# Patient Record
Sex: MALE | Race: BLACK OR AFRICAN AMERICAN | NOT HISPANIC OR LATINO | Employment: OTHER | URBAN - METROPOLITAN AREA
[De-identification: names, ages, dates, MRNs, and addresses within clinical notes are randomized per-mention and may not be internally consistent; named-entity substitution may affect disease eponyms.]

---

## 2017-12-08 ENCOUNTER — TRANSCRIBE ORDERS (OUTPATIENT)
Dept: ADMINISTRATIVE | Facility: HOSPITAL | Age: 62
End: 2017-12-08

## 2017-12-08 DIAGNOSIS — R59.1 LYMPHADENOPATHY: Primary | ICD-10-CM

## 2017-12-08 DIAGNOSIS — R93.89 ABNORMAL CHEST X-RAY: ICD-10-CM

## 2017-12-15 ENCOUNTER — HOSPITAL ENCOUNTER (OUTPATIENT)
Dept: CT IMAGING | Facility: HOSPITAL | Age: 62
Discharge: HOME | End: 2017-12-15
Attending: INTERNAL MEDICINE | Admitting: INTERNAL MEDICINE

## 2017-12-15 DIAGNOSIS — R93.89 ABNORMAL CHEST X-RAY: ICD-10-CM

## 2017-12-15 DIAGNOSIS — R59.1 LYMPHADENOPATHY: ICD-10-CM

## 2017-12-15 LAB — CREAT BLDA-MCNC: 1.3 MG/DL (ref 0.6–1.3)

## 2017-12-15 PROCEDURE — 71260 CT THORAX DX C+: CPT

## 2017-12-15 PROCEDURE — 82565 ASSAY OF CREATININE: CPT

## 2017-12-15 PROCEDURE — 0 IOPAMIDOL 61 % SOLUTION: Performed by: INTERNAL MEDICINE

## 2017-12-15 RX ADMIN — IOPAMIDOL 75 ML: 612 INJECTION, SOLUTION INTRAVENOUS at 09:30

## 2018-01-11 ENCOUNTER — ANESTHESIA (OUTPATIENT)
Dept: GASTROENTEROLOGY | Facility: HOSPITAL | Age: 63
End: 2018-01-11

## 2018-01-11 ENCOUNTER — ANESTHESIA EVENT (OUTPATIENT)
Dept: GASTROENTEROLOGY | Facility: HOSPITAL | Age: 63
End: 2018-01-11

## 2018-01-11 ENCOUNTER — APPOINTMENT (OUTPATIENT)
Dept: GENERAL RADIOLOGY | Facility: HOSPITAL | Age: 63
End: 2018-01-11

## 2018-01-11 ENCOUNTER — HOSPITAL ENCOUNTER (OUTPATIENT)
Facility: HOSPITAL | Age: 63
Setting detail: HOSPITAL OUTPATIENT SURGERY
Discharge: HOME | End: 2018-01-11
Attending: INTERNAL MEDICINE | Admitting: INTERNAL MEDICINE

## 2018-01-11 VITALS
OXYGEN SATURATION: 98 % | BODY MASS INDEX: 22.22 KG/M2 | HEIGHT: 69 IN | TEMPERATURE: 98.2 F | HEART RATE: 78 BPM | WEIGHT: 150 LBS | DIASTOLIC BLOOD PRESSURE: 70 MMHG | RESPIRATION RATE: 18 BRPM | SYSTOLIC BLOOD PRESSURE: 131 MMHG

## 2018-01-11 DIAGNOSIS — J84.9 INTERSTITIAL LUNG DISEASE (HCC): ICD-10-CM

## 2018-01-11 LAB
APPEARANCE FLD: ABNORMAL
COLOR FLD: COLORLESS
EOSINOPHIL NFR FLD MANUAL: 2 %
GIE STN SPEC: NORMAL
LYMPHOCYTES NFR FLD MANUAL: 31 %
MONOCYTES NFR FLD: 3 %
MONOS+MACROS NFR FLD: 54 %
NEUTROPHILS NFR FLD MANUAL: 10 %
OTHER CELLS FLUID PER 100/WBCS: 50 /100 WBCS
RBC # FLD AUTO: 17 /MM3
WBC # FLD: 60 /MM3

## 2018-01-11 PROCEDURE — 71045 X-RAY EXAM CHEST 1 VIEW: CPT

## 2018-01-11 PROCEDURE — 88112 CYTOPATH CELL ENHANCE TECH: CPT | Performed by: INTERNAL MEDICINE

## 2018-01-11 PROCEDURE — 87071 CULTURE AEROBIC QUANT OTHER: CPT | Performed by: INTERNAL MEDICINE

## 2018-01-11 PROCEDURE — 87206 SMEAR FLUORESCENT/ACID STAI: CPT | Performed by: INTERNAL MEDICINE

## 2018-01-11 PROCEDURE — 25010000002 PROPOFOL 10 MG/ML EMULSION: Performed by: ANESTHESIOLOGY

## 2018-01-11 PROCEDURE — 88305 TISSUE EXAM BY PATHOLOGIST: CPT | Performed by: INTERNAL MEDICINE

## 2018-01-11 PROCEDURE — 76000 FLUOROSCOPY <1 HR PHYS/QHP: CPT

## 2018-01-11 PROCEDURE — 25010000002 PROPOFOL 1000 MG/ML EMULSION: Performed by: ANESTHESIOLOGY

## 2018-01-11 PROCEDURE — 87116 MYCOBACTERIA CULTURE: CPT | Performed by: INTERNAL MEDICINE

## 2018-01-11 PROCEDURE — 87205 SMEAR GRAM STAIN: CPT | Performed by: INTERNAL MEDICINE

## 2018-01-11 PROCEDURE — 88312 SPECIAL STAINS GROUP 1: CPT | Performed by: INTERNAL MEDICINE

## 2018-01-11 PROCEDURE — 87102 FUNGUS ISOLATION CULTURE: CPT | Performed by: INTERNAL MEDICINE

## 2018-01-11 PROCEDURE — 89051 BODY FLUID CELL COUNT: CPT | Performed by: INTERNAL MEDICINE

## 2018-01-11 RX ORDER — SODIUM CHLORIDE, SODIUM LACTATE, POTASSIUM CHLORIDE, CALCIUM CHLORIDE 600; 310; 30; 20 MG/100ML; MG/100ML; MG/100ML; MG/100ML
30 INJECTION, SOLUTION INTRAVENOUS CONTINUOUS PRN
Status: DISCONTINUED | OUTPATIENT
Start: 2018-01-11 | End: 2018-01-11 | Stop reason: HOSPADM

## 2018-01-11 RX ORDER — SODIUM CHLORIDE, SODIUM LACTATE, POTASSIUM CHLORIDE, CALCIUM CHLORIDE 600; 310; 30; 20 MG/100ML; MG/100ML; MG/100ML; MG/100ML
INJECTION, SOLUTION INTRAVENOUS CONTINUOUS PRN
Status: DISCONTINUED | OUTPATIENT
Start: 2018-01-11 | End: 2018-01-11 | Stop reason: SURG

## 2018-01-11 RX ORDER — NISOLDIPINE 8.5 MG/1
8.5 TABLET, FILM COATED, EXTENDED RELEASE ORAL DAILY
COMMUNITY
End: 2021-08-29

## 2018-01-11 RX ORDER — PROPOFOL 10 MG/ML
VIAL (ML) INTRAVENOUS AS NEEDED
Status: DISCONTINUED | OUTPATIENT
Start: 2018-01-11 | End: 2018-01-11 | Stop reason: SURG

## 2018-01-11 RX ORDER — DIFLUPREDNATE OPHTHALMIC 0.5 MG/ML
1 EMULSION OPHTHALMIC 4 TIMES DAILY
Status: ON HOLD | COMMUNITY
End: 2018-01-11

## 2018-01-11 RX ORDER — LIDOCAINE HYDROCHLORIDE 10 MG/ML
INJECTION, SOLUTION EPIDURAL; INFILTRATION; INTRACAUDAL; PERINEURAL AS NEEDED
Status: DISCONTINUED | OUTPATIENT
Start: 2018-01-11 | End: 2018-01-11 | Stop reason: HOSPADM

## 2018-01-11 RX ORDER — LIDOCAINE HYDROCHLORIDE 20 MG/ML
INJECTION, SOLUTION INFILTRATION; PERINEURAL AS NEEDED
Status: DISCONTINUED | OUTPATIENT
Start: 2018-01-11 | End: 2018-01-11 | Stop reason: SURG

## 2018-01-11 RX ORDER — PREDNISOLONE ACETATE 10 MG/ML
1 SUSPENSION/ DROPS OPHTHALMIC 4 TIMES DAILY
COMMUNITY
End: 2021-08-29

## 2018-01-11 RX ORDER — EPHEDRINE SULFATE 50 MG/ML
INJECTION, SOLUTION INTRAVENOUS AS NEEDED
Status: DISCONTINUED | OUTPATIENT
Start: 2018-01-11 | End: 2018-01-11 | Stop reason: SURG

## 2018-01-11 RX ADMIN — PROPOFOL 150 MG: 10 INJECTION, EMULSION INTRAVENOUS at 09:15

## 2018-01-11 RX ADMIN — PROPOFOL 200 MG: 10 INJECTION, EMULSION INTRAVENOUS at 09:14

## 2018-01-11 RX ADMIN — EPHEDRINE SULFATE 10 MG: 50 INJECTION INTRAMUSCULAR; INTRAVENOUS; SUBCUTANEOUS at 09:33

## 2018-01-11 RX ADMIN — EPHEDRINE SULFATE 10 MG: 50 INJECTION INTRAMUSCULAR; INTRAVENOUS; SUBCUTANEOUS at 09:22

## 2018-01-11 RX ADMIN — SODIUM CHLORIDE, POTASSIUM CHLORIDE, SODIUM LACTATE AND CALCIUM CHLORIDE: 600; 310; 30; 20 INJECTION, SOLUTION INTRAVENOUS at 09:08

## 2018-01-11 RX ADMIN — PROPOFOL 200 MCG/KG/MIN: 10 INJECTION, EMULSION INTRAVENOUS at 09:18

## 2018-01-11 RX ADMIN — LIDOCAINE HYDROCHLORIDE 50 MG: 20 INJECTION, SOLUTION INFILTRATION; PERINEURAL at 09:14

## 2018-01-11 RX ADMIN — SODIUM CHLORIDE, POTASSIUM CHLORIDE, SODIUM LACTATE AND CALCIUM CHLORIDE 30 ML/HR: 600; 310; 30; 20 INJECTION, SOLUTION INTRAVENOUS at 09:01

## 2018-01-11 NOTE — PLAN OF CARE
Problem: Patient Care Overview (Adult)  Goal: Plan of Care Review  Outcome: Ongoing (interventions implemented as appropriate)   01/11/18 0847   Coping/Psychosocial Response Interventions   Plan Of Care Reviewed With patient   Patient Care Overview   Progress no change     Goal: Adult Individualization and Mutuality  Outcome: Ongoing (interventions implemented as appropriate)    Goal: Discharge Needs Assessment  Outcome: Ongoing (interventions implemented as appropriate)   01/11/18 0847   Discharge Needs Assessment   Concerns To Be Addressed basic needs concerns   Discharge Disposition home or self-care   Living Environment   Transportation Available car       Problem: GI Endoscopy (Adult)  Goal: Signs and Symptoms of Listed Potential Problems Will be Absent or Manageable (GI Endoscopy)  Outcome: Ongoing (interventions implemented as appropriate)   01/11/18 0847   GI Endoscopy   Problems Assessed (GI Endoscopy) pain   Problems Present (GI Endoscopy) none

## 2018-01-11 NOTE — ANESTHESIA PREPROCEDURE EVALUATION
Anesthesia Evaluation     Patient summary reviewed and Nursing notes reviewed   NPO Solid Status: > 8 hours  NPO Liquid Status: > 8 hours     Airway   Mallampati: II  Dental      Pulmonary - normal exam    breath sounds clear to auscultation  (+) shortness of breath,   Cardiovascular - normal exam    (+) hypertension,       Neuro/Psych- negative ROS  GI/Hepatic/Renal/Endo - negative ROS     Musculoskeletal (-) negative ROS    Abdominal    Substance History - negative use     OB/GYN          Other - negative ROS                                               Anesthesia Plan    ASA 2     general   total IV anesthesia  intravenous induction   Anesthetic plan and risks discussed with patient.

## 2018-01-11 NOTE — ANESTHESIA POSTPROCEDURE EVALUATION
"Patient: Jaket A Navdeep    Procedure Summary     Date Anesthesia Start Anesthesia Stop Room / Location    01/11/18 0908 0951  [facility]       Procedure Diagnosis Surgeon Provider    BRONCHOSCOPY WITH FLUORO AND CRYO LUNG BIOPSY (N/A Bronchus) No diagnosis on file. MD Crow Brewer MD          Anesthesia Type: general  Last vitals  BP   97/56 (01/11/18 0949)   Temp   36.8 °C (98.2 °F) (01/11/18 0949)   Pulse   76 (01/11/18 0949)   Resp   16 (01/11/18 0949)     SpO2   97 % (01/11/18 0949)     Post Anesthesia Care and Evaluation    Patient location during evaluation: PACU  Patient participation: complete - patient participated  Level of consciousness: awake  Pain score: 0  Pain management: adequate  Airway patency: patent  Anesthetic complications: No anesthetic complications  PONV Status: none  Cardiovascular status: acceptable  Respiratory status: acceptable  Hydration status: acceptable    Comments: BP 97/56  Pulse 76  Temp 36.8 °C (98.2 °F)  Resp 16  Ht 175.3 cm (69\")  Wt 68 kg (150 lb)  SpO2 97%  BMI 22.15 kg/m2      "

## 2018-01-11 NOTE — ANESTHESIA PROCEDURE NOTES
Airway  Urgency: elective    Difficult airway    General Information and Staff    Patient location during procedure: OR  Anesthesiologist: EDVIN WELSH    Indications and Patient Condition  Indications for airway management: airway protection    Preoxygenated: yes  Mask difficulty assessment: 1 - vent by mask    Final Airway Details  Final airway type: supraglottic airway      Successful airway: classic  Size 5    Number of attempts at approach: 1    Additional Comments  LMA inserted with ease

## 2018-01-11 NOTE — NURSING NOTE
Pt's portable chest xray post procedure stated that there is no pneumothorax. Informed Dr. Baum. Pt ok to d.c per Dr. Baum. Pt is in no respiratory distress at this time.

## 2018-01-11 NOTE — OP NOTE
Bronchoscopy Procedure Note    Procedure:  1. Bronchoscopy, Diagnostic    Pre-Operative Diagnosis:  Interstitial lung disease    Post-Operative Diagnosis: Same    Indication:   Interstitial lung disease    Anesthesia: Monitored Anesthesia Care (MAC)    Procedure Details: Patient was consented for the procedure with all risk and benefit of the procedure explained in detail.  Patient was given the opportunity to ask questions and all concerns were answered.  The bronchocope was inserted into the main airway via the LMA. An anatomical survey was done of the main airways and the subsegmental bronchus to at least the first subsegmental level of all five lobes of both lungs.  The findings are reported below.  A bronchialalveolar lavage was performed using aliquots of normal saline instilled into the airways then aspirated back.    Findings:  Bronchoscope passed through LMA to the level of the vocal cords.  Lidocaine used for local anesthetic over vocal cords.  Bronchoscope was passed between the vocal cords into the trachea.  All airways were visualized to at least the first subsegment level of all 5 lobes of both lungs.  Airways were of normal size and caliber.  No endobronchial lesions seen.  Bronchial alveolar lavage performed in left lower lobe with 120cc saline instilled and 60cc cellular return.  Fluoroscopically guided transbronchial biopsies performed in the left lower lobe x 3 and then fluoroscopically guided transbronchial biopsies performed with cryoprobe x 3 in left lower lobe.  Cryo biopsy tissue samples quite large and will send for lung pathologist outside of [facility] to review.    Estimated Blood Loss:  Minimal           Specimens:  As above                Complications:  None; patient tolerated the procedure well.           Disposition: PACU - hemodynamically stable.      Patient tolerated the procedure well.    Den Baum MD  1/11/2018  9:38 AM

## 2018-01-11 NOTE — ANESTHESIA POSTPROCEDURE EVALUATION
"Patient: Nahumnest A Navdeep    Procedure Summary     Date Anesthesia Start Anesthesia Stop Room / Location    01/11/18 0908 0951  [facility]       Procedure Diagnosis Surgeon Provider    BRONCHOSCOPY WITH FLUORO AND CRYO LUNG BIOPSY (N/A Bronchus) No diagnosis on file. MD Crow Brewer MD          Anesthesia Type: general  Last vitals  BP   131/70 (01/11/18 1011)   Temp   36.8 °C (98.2 °F) (01/11/18 0949)   Pulse   78 (01/11/18 1011)   Resp   18 (01/11/18 1011)     SpO2   98 % (01/11/18 1011)     Post Anesthesia Care and Evaluation    Patient location during evaluation: PACU  Patient participation: complete - patient participated  Level of consciousness: awake and alert  Pain management: adequate  Airway patency: patent  Anesthetic complications: No anesthetic complications    Cardiovascular status: acceptable  Respiratory status: acceptable  Hydration status: acceptable    Comments: /70  Pulse 78  Temp 36.8 °C (98.2 °F)  Resp 18  Ht 175.3 cm (69\")  Wt 68 kg (150 lb)  SpO2 98%  BMI 22.15 kg/m2              "

## 2018-01-12 LAB — REF LAB TEST METHOD: NORMAL

## 2018-01-13 LAB
BACTERIA SPEC AEROBE CULT: NO GROWTH
GRAM STN SPEC: NORMAL
GRAM STN SPEC: NORMAL

## 2018-01-15 LAB
CYTO UR: NORMAL
LAB AP CASE REPORT: NORMAL
Lab: NORMAL
PATH REPORT.ADDENDUM SPEC: NORMAL
PATH REPORT.FINAL DX SPEC: NORMAL
PATH REPORT.GROSS SPEC: NORMAL

## 2018-01-19 LAB
LAB AP CASE REPORT: NORMAL
Lab: NORMAL
PATH REPORT.ADDENDUM SPEC: NORMAL
PATH REPORT.FINAL DX SPEC: NORMAL
PATH REPORT.GROSS SPEC: NORMAL

## 2018-02-08 LAB — FUNGUS WND CULT: NORMAL

## 2018-02-22 LAB
MYCOBACTERIUM SPEC CULT: NORMAL
NIGHT BLUE STAIN TISS: NORMAL

## 2018-03-18 ENCOUNTER — HOSPITAL ENCOUNTER (INPATIENT)
Facility: HOSPITAL | Age: 63
LOS: 5 days | Discharge: HOME HEALTH | End: 2018-03-23
Attending: INTERNAL MEDICINE | Admitting: INTERNAL MEDICINE

## 2018-03-18 PROBLEM — A41.9 SEPSIS (HCC): Status: ACTIVE | Noted: 2018-03-18

## 2018-03-18 LAB
ALBUMIN SERPL-MCNC: 2.4 G/DL (ref 3.5–5.2)
ALBUMIN/GLOB SERPL: 0.9 G/DL
ALP SERPL-CCNC: 77 U/L (ref 39–117)
ALT SERPL W P-5'-P-CCNC: 21 U/L (ref 1–41)
ANION GAP SERPL CALCULATED.3IONS-SCNC: 15.2 MMOL/L
ANISOCYTOSIS BLD QL: ABNORMAL
AST SERPL-CCNC: 17 U/L (ref 1–40)
BILIRUB SERPL-MCNC: 1 MG/DL (ref 0.1–1.2)
BUN BLD-MCNC: 31 MG/DL (ref 8–23)
BUN/CREAT SERPL: 15.9 (ref 7–25)
CALCIUM SPEC-SCNC: 7.1 MG/DL (ref 8.6–10.5)
CHLORIDE SERPL-SCNC: 104 MMOL/L (ref 98–107)
CO2 SERPL-SCNC: 18.8 MMOL/L (ref 22–29)
CREAT BLD-MCNC: 1.95 MG/DL (ref 0.76–1.27)
D-LACTATE SERPL-SCNC: 2.9 MMOL/L (ref 0.5–2)
DEPRECATED RDW RBC AUTO: 75.8 FL (ref 37–54)
EOSINOPHIL # BLD MANUAL: 0.01 10*3/MM3 (ref 0–0.7)
EOSINOPHIL NFR BLD MANUAL: 1 % (ref 0.3–6.2)
ERYTHROCYTE [DISTWIDTH] IN BLOOD BY AUTOMATED COUNT: 23.5 % (ref 11.5–14.5)
GFR SERPL CREATININE-BSD FRML MDRD: 43 ML/MIN/1.73
GLOBULIN UR ELPH-MCNC: 2.6 GM/DL
GLUCOSE BLD-MCNC: 146 MG/DL (ref 65–99)
GLUCOSE BLDC GLUCOMTR-MCNC: 77 MG/DL (ref 70–130)
HCT VFR BLD AUTO: 35.8 % (ref 40.4–52.2)
HGB BLD-MCNC: 11.2 G/DL (ref 13.7–17.6)
LYMPHOCYTES # BLD MANUAL: 0.15 10*3/MM3 (ref 0.9–4.8)
LYMPHOCYTES NFR BLD MANUAL: 16 % (ref 19.6–45.3)
LYMPHOCYTES NFR BLD MANUAL: 5 % (ref 5–12)
MCH RBC QN AUTO: 29.7 PG (ref 27–32.7)
MCHC RBC AUTO-ENTMCNC: 31.3 G/DL (ref 32.6–36.4)
MCV RBC AUTO: 95 FL (ref 79.8–96.2)
METAMYELOCYTES NFR BLD MANUAL: 2 % (ref 0–0)
MONOCYTES # BLD AUTO: 0.05 10*3/MM3 (ref 0.2–1.2)
NEUTROPHILS # BLD AUTO: 0.73 10*3/MM3 (ref 1.9–8.1)
NEUTROPHILS NFR BLD MANUAL: 76 % (ref 42.7–76)
NRBC SPEC MANUAL: 2 /100 WBC (ref 0–0)
OVALOCYTES BLD QL SMEAR: ABNORMAL
PLAT MORPH BLD: NORMAL
PLATELET # BLD AUTO: 139 10*3/MM3 (ref 140–500)
PMV BLD AUTO: 9.9 FL (ref 6–12)
POTASSIUM BLD-SCNC: 4.3 MMOL/L (ref 3.5–5.2)
PROT SERPL-MCNC: 5 G/DL (ref 6–8.5)
RBC # BLD AUTO: 3.77 10*6/MM3 (ref 4.6–6)
SCAN SLIDE: NORMAL
SODIUM BLD-SCNC: 138 MMOL/L (ref 136–145)
WBC MORPH BLD: NORMAL
WBC NRBC COR # BLD: 0.96 10*3/MM3 (ref 4.5–10.7)

## 2018-03-18 PROCEDURE — 25010000002 ENOXAPARIN PER 10 MG: Performed by: INTERNAL MEDICINE

## 2018-03-18 PROCEDURE — 85007 BL SMEAR W/DIFF WBC COUNT: CPT | Performed by: INTERNAL MEDICINE

## 2018-03-18 PROCEDURE — 82962 GLUCOSE BLOOD TEST: CPT

## 2018-03-18 PROCEDURE — 83605 ASSAY OF LACTIC ACID: CPT | Performed by: INTERNAL MEDICINE

## 2018-03-18 PROCEDURE — 25010000002 ONDANSETRON PER 1 MG: Performed by: INTERNAL MEDICINE

## 2018-03-18 PROCEDURE — 25010000002 HYDROCORTISONE SODIUM SUCCINATE 100 MG RECONSTITUTED SOLUTION: Performed by: INTERNAL MEDICINE

## 2018-03-18 PROCEDURE — 85025 COMPLETE CBC W/AUTO DIFF WBC: CPT | Performed by: INTERNAL MEDICINE

## 2018-03-18 PROCEDURE — 25010000002 PIPERACILLIN SOD-TAZOBACTAM PER 1 G: Performed by: INTERNAL MEDICINE

## 2018-03-18 PROCEDURE — 25010000002 AZITHROMYCIN PER 500 MG: Performed by: INTERNAL MEDICINE

## 2018-03-18 PROCEDURE — 80053 COMPREHEN METABOLIC PANEL: CPT | Performed by: INTERNAL MEDICINE

## 2018-03-18 RX ORDER — ONDANSETRON 2 MG/ML
4 INJECTION INTRAMUSCULAR; INTRAVENOUS EVERY 6 HOURS PRN
Status: DISCONTINUED | OUTPATIENT
Start: 2018-03-18 | End: 2018-03-23 | Stop reason: HOSPADM

## 2018-03-18 RX ORDER — POTASSIUM CHLORIDE 750 MG/1
40 CAPSULE, EXTENDED RELEASE ORAL AS NEEDED
Status: DISCONTINUED | OUTPATIENT
Start: 2018-03-18 | End: 2018-03-23 | Stop reason: HOSPADM

## 2018-03-18 RX ORDER — SENNA AND DOCUSATE SODIUM 50; 8.6 MG/1; MG/1
2 TABLET, FILM COATED ORAL NIGHTLY
Status: DISCONTINUED | OUTPATIENT
Start: 2018-03-18 | End: 2018-03-23 | Stop reason: HOSPADM

## 2018-03-18 RX ORDER — IPRATROPIUM BROMIDE AND ALBUTEROL SULFATE 2.5; .5 MG/3ML; MG/3ML
3 SOLUTION RESPIRATORY (INHALATION) EVERY 4 HOURS PRN
Status: DISCONTINUED | OUTPATIENT
Start: 2018-03-18 | End: 2018-03-23 | Stop reason: HOSPADM

## 2018-03-18 RX ORDER — POTASSIUM CHLORIDE 1.5 G/1.77G
40 POWDER, FOR SOLUTION ORAL AS NEEDED
Status: DISCONTINUED | OUTPATIENT
Start: 2018-03-18 | End: 2018-03-23 | Stop reason: HOSPADM

## 2018-03-18 RX ORDER — ACETAMINOPHEN 650 MG/1
650 SUPPOSITORY RECTAL EVERY 4 HOURS PRN
Status: DISCONTINUED | OUTPATIENT
Start: 2018-03-18 | End: 2018-03-23 | Stop reason: HOSPADM

## 2018-03-18 RX ORDER — FLUDROCORTISONE ACETATE 0.1 MG/1
50 TABLET ORAL DAILY
Status: DISCONTINUED | OUTPATIENT
Start: 2018-03-18 | End: 2018-03-20 | Stop reason: ALTCHOICE

## 2018-03-18 RX ORDER — NICOTINE POLACRILEX 4 MG
15 LOZENGE BUCCAL
Status: DISCONTINUED | OUTPATIENT
Start: 2018-03-18 | End: 2018-03-23 | Stop reason: HOSPADM

## 2018-03-18 RX ORDER — SODIUM CHLORIDE 0.9 % (FLUSH) 0.9 %
1-10 SYRINGE (ML) INJECTION AS NEEDED
Status: DISCONTINUED | OUTPATIENT
Start: 2018-03-18 | End: 2018-03-23 | Stop reason: HOSPADM

## 2018-03-18 RX ORDER — ACETAMINOPHEN 325 MG/1
650 TABLET ORAL EVERY 4 HOURS PRN
Status: DISCONTINUED | OUTPATIENT
Start: 2018-03-18 | End: 2018-03-23 | Stop reason: HOSPADM

## 2018-03-18 RX ORDER — POTASSIUM CHLORIDE 7.45 MG/ML
10 INJECTION INTRAVENOUS
Status: DISCONTINUED | OUTPATIENT
Start: 2018-03-18 | End: 2018-03-23 | Stop reason: HOSPADM

## 2018-03-18 RX ORDER — ONDANSETRON 4 MG/1
4 TABLET, FILM COATED ORAL EVERY 6 HOURS PRN
Status: DISCONTINUED | OUTPATIENT
Start: 2018-03-18 | End: 2018-03-23 | Stop reason: HOSPADM

## 2018-03-18 RX ORDER — ASCORBIC ACID 500 MG/ML
1500 INJECTION, SOLUTION INTRAMUSCULAR; INTRAVENOUS; SUBCUTANEOUS EVERY 6 HOURS
Status: DISCONTINUED | OUTPATIENT
Start: 2018-03-18 | End: 2018-03-18

## 2018-03-18 RX ORDER — DEXTROSE MONOHYDRATE 25 G/50ML
25 INJECTION, SOLUTION INTRAVENOUS
Status: DISCONTINUED | OUTPATIENT
Start: 2018-03-18 | End: 2018-03-23 | Stop reason: HOSPADM

## 2018-03-18 RX ORDER — IPRATROPIUM BROMIDE AND ALBUTEROL SULFATE 2.5; .5 MG/3ML; MG/3ML
3 SOLUTION RESPIRATORY (INHALATION)
Status: DISCONTINUED | OUTPATIENT
Start: 2018-03-18 | End: 2018-03-23 | Stop reason: HOSPADM

## 2018-03-18 RX ORDER — ONDANSETRON 4 MG/1
4 TABLET, ORALLY DISINTEGRATING ORAL EVERY 6 HOURS PRN
Status: DISCONTINUED | OUTPATIENT
Start: 2018-03-18 | End: 2018-03-23 | Stop reason: HOSPADM

## 2018-03-18 RX ORDER — SODIUM CHLORIDE, SODIUM LACTATE, POTASSIUM CHLORIDE, CALCIUM CHLORIDE 600; 310; 30; 20 MG/100ML; MG/100ML; MG/100ML; MG/100ML
100 INJECTION, SOLUTION INTRAVENOUS CONTINUOUS
Status: DISCONTINUED | OUTPATIENT
Start: 2018-03-18 | End: 2018-03-20

## 2018-03-18 RX ADMIN — FLUDROCORTISONE ACETATE 50 MCG: 0.1 TABLET ORAL at 21:29

## 2018-03-18 RX ADMIN — ENOXAPARIN SODIUM 40 MG: 40 INJECTION SUBCUTANEOUS at 20:57

## 2018-03-18 RX ADMIN — AZITHROMYCIN 500 MG: 500 INJECTION, POWDER, LYOPHILIZED, FOR SOLUTION INTRAVENOUS at 21:34

## 2018-03-18 RX ADMIN — SODIUM CHLORIDE, POTASSIUM CHLORIDE, SODIUM LACTATE AND CALCIUM CHLORIDE 1000 ML: 600; 310; 30; 20 INJECTION, SOLUTION INTRAVENOUS at 23:21

## 2018-03-18 RX ADMIN — HYDROCORTISONE SODIUM SUCCINATE 50 MG: 100 INJECTION, POWDER, FOR SOLUTION INTRAMUSCULAR; INTRAVENOUS at 20:57

## 2018-03-18 RX ADMIN — SODIUM CHLORIDE, POTASSIUM CHLORIDE, SODIUM LACTATE AND CALCIUM CHLORIDE 100 ML/HR: 600; 310; 30; 20 INJECTION, SOLUTION INTRAVENOUS at 21:00

## 2018-03-18 RX ADMIN — ONDANSETRON 4 MG: 2 INJECTION, SOLUTION INTRAMUSCULAR; INTRAVENOUS at 22:26

## 2018-03-18 RX ADMIN — DOCUSATE SODIUM -SENNOSIDES 2 TABLET: 50; 8.6 TABLET, COATED ORAL at 21:29

## 2018-03-18 RX ADMIN — SODIUM CHLORIDE, POTASSIUM CHLORIDE, SODIUM LACTATE AND CALCIUM CHLORIDE 100 ML/HR: 600; 310; 30; 20 INJECTION, SOLUTION INTRAVENOUS at 23:19

## 2018-03-18 RX ADMIN — TAZOBACTAM SODIUM AND PIPERACILLIN SODIUM 3.38 G: 375; 3 INJECTION, SOLUTION INTRAVENOUS at 20:57

## 2018-03-18 NOTE — H&P
[facility]    CC: UTO (ems called for unresponsiveness)    HPI:  Mr. Sethi is a 63yo AAM with a history of Sarcoidosis diagnosed by Dr. Baum back in Jan of this year.  He has been on methotrexate and prednisone at home.  He had sudden onset of cough and diarrhea three days ago and felt sick in general. He had fever.  He passed out in the bathroom and struck his head and was brought to the ER at [facility].  He was hypotensive there and started on dopamine. He got zosyn and 3+liters of fluid as well it looks like.     Past Medical History:   Diagnosis Date   • Dyspnea on exertion    • Hypertension    • Lymphadenopathy    • Pulmonary fibrosis    Sarcoidosis    Family History   Problem Relation Age of Onset   • Prostate cancer Father    • Prostate cancer Brother      Social History     Social History   • Marital status:      Social History Main Topics   • Smoking status: Former Smoker      Comment: Quit 45 years ago   • Alcohol use Yes      Comment: Rare   • Drug use: Unknown     Other Topics Concern   • Not on file     MEDS: reviewed  ALL: NKDA  ROS: 10 point negative except as in hpi    Vital Sign Min/Max for last 24 hours  Temp  Min: 98.9 °F (37.2 °C)  Max: 98.9 °F (37.2 °C)   BP  Min: 98/55  Max: 129/106   Pulse  Min: 111  Max: 112   No Data Recorded   SpO2  Min: 91 %  Max: 92 %   No Data Recorded   Weight  Min: 73.3 kg (161 lb 9.6 oz)  Max: 73.3 kg (161 lb 9.6 oz)     GEN:  appears ill, AxOx3  HEENT: PERRL, EOMI, no icterus, mmm, no jvd, trachea midline, neck supple  CHEST: decreased bilat, no wheezes, + crackles more on the left, no use of accessory muscles  CV: tachy, regualr, no m/g/r  ABD: soft, nt, nd +bs, no hepatosplenomegaly  EXT: no c/c/e; normal capillary refill; pulses are 2+  SKIN: no rashes, no xanthomas, nl turgor  LYMPH: no palpable cervical or supraclavicular lymphadenopathy  NEURO: CN 2-12 intact and symmetric bilaterally  PSYCH: nl affect, nl orientation, nl  judgement, nl mood  MSK: no kyphoscoliosis, 5/5 strength ue and le bilaterally    Labs:  7.38/38/73  Lactic 4.3  Na 134  Bicarb 23  k 3.9  Bun 33  Cr 2.18  Trop 0.05  Wbc 1.3  hgb 13  plts 168    CXR: (per report) left sided infiltrate    A/P:  1. Sepsis with septic shock -- continue lactated ringers.  Will add stress dose hydrocortisone and fludricortisone.  Continue dopamine for now.  If unable to titrate off will need to place cental line and consider switch to levophed.   2. Pneumonia -- check infectious studies, continue zosyn; add zithromax;   3. Sarcoidosis -- he is on chronic prednisone and methotrexate; hold methotrexate for now, might need to reconsider this agent given leukopenia  4. BALDEV -- iv fluids, support bp and avoid nephrotoxins. Monitor urine output  5. Acute hypoxemic respiratory failure -- continue oxygen  6. Lactic acidosiss - support bp, continue to trend  7. Hyponatremia  8. Leukopenia - 2/2 methotrexate vs. Infection.  Continue to monitor, hold methotrexate.    D/w family and with RN     CC 43 mins.

## 2018-03-19 ENCOUNTER — APPOINTMENT (OUTPATIENT)
Dept: GENERAL RADIOLOGY | Facility: HOSPITAL | Age: 63
End: 2018-03-19
Attending: INTERNAL MEDICINE

## 2018-03-19 LAB
ALBUMIN SERPL-MCNC: 2.7 G/DL (ref 3.5–5.2)
ALBUMIN/GLOB SERPL: 0.9 G/DL
ALP SERPL-CCNC: 81 U/L (ref 39–117)
ALT SERPL W P-5'-P-CCNC: 23 U/L (ref 1–41)
ANION GAP SERPL CALCULATED.3IONS-SCNC: 13.6 MMOL/L
ANISOCYTOSIS BLD QL: NORMAL
AST SERPL-CCNC: 21 U/L (ref 1–40)
B PERT DNA SPEC QL NAA+PROBE: NOT DETECTED
BACTERIA SPEC RESP CULT: NORMAL
BASOPHILS # BLD AUTO: 0 10*3/MM3 (ref 0–0.2)
BASOPHILS NFR BLD AUTO: 0 % (ref 0–1.5)
BILIRUB SERPL-MCNC: 1 MG/DL (ref 0.1–1.2)
BUN BLD-MCNC: 30 MG/DL (ref 8–23)
BUN/CREAT SERPL: 16.9 (ref 7–25)
C PNEUM DNA NPH QL NAA+NON-PROBE: NOT DETECTED
CALCIUM SPEC-SCNC: 7.6 MG/DL (ref 8.6–10.5)
CHLORIDE SERPL-SCNC: 106 MMOL/L (ref 98–107)
CO2 SERPL-SCNC: 19.4 MMOL/L (ref 22–29)
CREAT BLD-MCNC: 1.77 MG/DL (ref 0.76–1.27)
D-LACTATE SERPL-SCNC: 1.4 MMOL/L (ref 0.5–2)
D-LACTATE SERPL-SCNC: 1.7 MMOL/L (ref 0.5–2)
DEPRECATED RDW RBC AUTO: 76.3 FL (ref 37–54)
EOSINOPHIL # BLD AUTO: 0 10*3/MM3 (ref 0–0.7)
EOSINOPHIL NFR BLD AUTO: 0 % (ref 0.3–6.2)
ERYTHROCYTE [DISTWIDTH] IN BLOOD BY AUTOMATED COUNT: 23.8 % (ref 11.5–14.5)
FLUAV H1 2009 PAND RNA NPH QL NAA+PROBE: NOT DETECTED
FLUAV H1 HA GENE NPH QL NAA+PROBE: NOT DETECTED
FLUAV H3 RNA NPH QL NAA+PROBE: NOT DETECTED
FLUAV SUBTYP SPEC NAA+PROBE: NOT DETECTED
FLUBV RNA ISLT QL NAA+PROBE: NOT DETECTED
GFR SERPL CREATININE-BSD FRML MDRD: 48 ML/MIN/1.73
GLOBULIN UR ELPH-MCNC: 3 GM/DL
GLUCOSE BLD-MCNC: 157 MG/DL (ref 65–99)
GLUCOSE BLDC GLUCOMTR-MCNC: 110 MG/DL (ref 70–130)
GLUCOSE BLDC GLUCOMTR-MCNC: 140 MG/DL (ref 70–130)
GLUCOSE BLDC GLUCOMTR-MCNC: 148 MG/DL (ref 70–130)
GLUCOSE BLDC GLUCOMTR-MCNC: 157 MG/DL (ref 70–130)
GLUCOSE BLDC GLUCOMTR-MCNC: 179 MG/DL (ref 70–130)
GLUCOSE BLDC GLUCOMTR-MCNC: 202 MG/DL (ref 70–130)
GRAM STN SPEC: NORMAL
HADV DNA SPEC NAA+PROBE: NOT DETECTED
HCOV 229E RNA SPEC QL NAA+PROBE: NOT DETECTED
HCOV HKU1 RNA SPEC QL NAA+PROBE: NOT DETECTED
HCOV NL63 RNA SPEC QL NAA+PROBE: NOT DETECTED
HCOV OC43 RNA SPEC QL NAA+PROBE: NOT DETECTED
HCT VFR BLD AUTO: 37.8 % (ref 40.4–52.2)
HGB BLD-MCNC: 12.1 G/DL (ref 13.7–17.6)
HMPV RNA NPH QL NAA+NON-PROBE: NOT DETECTED
HOLD SPECIMEN: NORMAL
HPIV1 RNA SPEC QL NAA+PROBE: NOT DETECTED
HPIV2 RNA SPEC QL NAA+PROBE: NOT DETECTED
HPIV3 RNA NPH QL NAA+PROBE: NOT DETECTED
HPIV4 P GENE NPH QL NAA+PROBE: NOT DETECTED
HYPOCHROMIA BLD QL: NORMAL
IMM GRANULOCYTES # BLD: 0 10*3/MM3 (ref 0–0.03)
IMM GRANULOCYTES NFR BLD: 0 % (ref 0–0.5)
L PNEUMO1 AG UR QL IA: NEGATIVE
LYMPHOCYTES # BLD AUTO: 0.04 10*3/MM3 (ref 0.9–4.8)
LYMPHOCYTES NFR BLD AUTO: 1.2 % (ref 19.6–45.3)
M PNEUMO IGG SER IA-ACNC: NOT DETECTED
MCH RBC QN AUTO: 30 PG (ref 27–32.7)
MCHC RBC AUTO-ENTMCNC: 32 G/DL (ref 32.6–36.4)
MCV RBC AUTO: 93.8 FL (ref 79.8–96.2)
MONOCYTES # BLD AUTO: 0.3 10*3/MM3 (ref 0.2–1.2)
MONOCYTES NFR BLD AUTO: 8.7 % (ref 5–12)
NEUTROPHILS # BLD AUTO: 3.09 10*3/MM3 (ref 1.9–8.1)
NEUTROPHILS NFR BLD AUTO: 89.8 % (ref 42.7–76)
NRBC BLD MANUAL-RTO: 1.6 /100 WBC (ref 0–0)
PLAT MORPH BLD: NORMAL
PLATELET # BLD AUTO: 141 10*3/MM3 (ref 140–500)
PMV BLD AUTO: 9.9 FL (ref 6–12)
POTASSIUM BLD-SCNC: 4.2 MMOL/L (ref 3.5–5.2)
PROT SERPL-MCNC: 5.7 G/DL (ref 6–8.5)
RBC # BLD AUTO: 4.03 10*6/MM3 (ref 4.6–6)
RHINOVIRUS RNA SPEC NAA+PROBE: NOT DETECTED
RSV RNA NPH QL NAA+NON-PROBE: NOT DETECTED
S PNEUM AG SPEC QL LA: NEGATIVE
SODIUM BLD-SCNC: 139 MMOL/L (ref 136–145)
WBC MORPH BLD: NORMAL
WBC NRBC COR # BLD: 3.44 10*3/MM3 (ref 4.5–10.7)

## 2018-03-19 PROCEDURE — 85025 COMPLETE CBC W/AUTO DIFF WBC: CPT | Performed by: INTERNAL MEDICINE

## 2018-03-19 PROCEDURE — 02HV33Z INSERTION OF INFUSION DEVICE INTO SUPERIOR VENA CAVA, PERCUTANEOUS APPROACH: ICD-10-PCS | Performed by: INTERNAL MEDICINE

## 2018-03-19 PROCEDURE — 87633 RESP VIRUS 12-25 TARGETS: CPT | Performed by: INTERNAL MEDICINE

## 2018-03-19 PROCEDURE — 25010000002 AZITHROMYCIN PER 500 MG: Performed by: INTERNAL MEDICINE

## 2018-03-19 PROCEDURE — 25010000002 DOPAMINE PER 40 MG: Performed by: INTERNAL MEDICINE

## 2018-03-19 PROCEDURE — 25010000002 THIAMINE PER 100 MG: Performed by: INTERNAL MEDICINE

## 2018-03-19 PROCEDURE — 85007 BL SMEAR W/DIFF WBC COUNT: CPT | Performed by: INTERNAL MEDICINE

## 2018-03-19 PROCEDURE — 80053 COMPREHEN METABOLIC PANEL: CPT | Performed by: INTERNAL MEDICINE

## 2018-03-19 PROCEDURE — 87798 DETECT AGENT NOS DNA AMP: CPT | Performed by: INTERNAL MEDICINE

## 2018-03-19 PROCEDURE — 25010000002 ENOXAPARIN PER 10 MG: Performed by: INTERNAL MEDICINE

## 2018-03-19 PROCEDURE — 87486 CHLMYD PNEUM DNA AMP PROBE: CPT | Performed by: INTERNAL MEDICINE

## 2018-03-19 PROCEDURE — 25010000002 HYDROCORTISONE SODIUM SUCCINATE 100 MG RECONSTITUTED SOLUTION: Performed by: INTERNAL MEDICINE

## 2018-03-19 PROCEDURE — 87899 AGENT NOS ASSAY W/OPTIC: CPT | Performed by: INTERNAL MEDICINE

## 2018-03-19 PROCEDURE — 63710000001 INSULIN ASPART PER 5 UNITS: Performed by: INTERNAL MEDICINE

## 2018-03-19 PROCEDURE — 83605 ASSAY OF LACTIC ACID: CPT | Performed by: INTERNAL MEDICINE

## 2018-03-19 PROCEDURE — B548ZZA ULTRASONOGRAPHY OF SUPERIOR VENA CAVA, GUIDANCE: ICD-10-PCS | Performed by: INTERNAL MEDICINE

## 2018-03-19 PROCEDURE — 87581 M.PNEUMON DNA AMP PROBE: CPT | Performed by: INTERNAL MEDICINE

## 2018-03-19 PROCEDURE — 82962 GLUCOSE BLOOD TEST: CPT

## 2018-03-19 PROCEDURE — 25010000002 PIPERACILLIN SOD-TAZOBACTAM PER 1 G: Performed by: INTERNAL MEDICINE

## 2018-03-19 PROCEDURE — 87070 CULTURE OTHR SPECIMN AEROBIC: CPT | Performed by: INTERNAL MEDICINE

## 2018-03-19 PROCEDURE — 87205 SMEAR GRAM STAIN: CPT | Performed by: INTERNAL MEDICINE

## 2018-03-19 RX ORDER — PREDNISONE 20 MG/1
40 TABLET ORAL DAILY
Status: ON HOLD | COMMUNITY
End: 2018-03-23

## 2018-03-19 RX ORDER — FAMOTIDINE 20 MG/1
20 TABLET, FILM COATED ORAL 2 TIMES DAILY
Status: DISCONTINUED | OUTPATIENT
Start: 2018-03-19 | End: 2018-03-23 | Stop reason: HOSPADM

## 2018-03-19 RX ORDER — PAROXETINE HYDROCHLORIDE 20 MG/1
20 TABLET, FILM COATED ORAL EVERY MORNING
COMMUNITY
End: 2021-08-29

## 2018-03-19 RX ADMIN — ASCORBIC ACID: 500 INJECTION, SOLUTION INTRAMUSCULAR; INTRAVENOUS; SUBCUTANEOUS at 04:49

## 2018-03-19 RX ADMIN — FAMOTIDINE 20 MG: 20 TABLET, FILM COATED ORAL at 20:12

## 2018-03-19 RX ADMIN — DOPAMINE HYDROCHLORIDE 12 MCG/KG/MIN: 40 INJECTION, SOLUTION, CONCENTRATE INTRAVENOUS at 08:21

## 2018-03-19 RX ADMIN — HYDROCORTISONE SODIUM SUCCINATE 50 MG: 100 INJECTION, POWDER, FOR SOLUTION INTRAMUSCULAR; INTRAVENOUS at 20:12

## 2018-03-19 RX ADMIN — FAMOTIDINE 20 MG: 20 TABLET, FILM COATED ORAL at 10:58

## 2018-03-19 RX ADMIN — SODIUM CHLORIDE, POTASSIUM CHLORIDE, SODIUM LACTATE AND CALCIUM CHLORIDE 100 ML/HR: 600; 310; 30; 20 INJECTION, SOLUTION INTRAVENOUS at 11:02

## 2018-03-19 RX ADMIN — TAZOBACTAM SODIUM AND PIPERACILLIN SODIUM 3.38 G: 375; 3 INJECTION, SOLUTION INTRAVENOUS at 20:12

## 2018-03-19 RX ADMIN — HYDROCORTISONE SODIUM SUCCINATE 50 MG: 100 INJECTION, POWDER, FOR SOLUTION INTRAMUSCULAR; INTRAVENOUS at 02:41

## 2018-03-19 RX ADMIN — FLUDROCORTISONE ACETATE 50 MCG: 0.1 TABLET ORAL at 08:21

## 2018-03-19 RX ADMIN — ASCORBIC ACID: 500 INJECTION, SOLUTION INTRAMUSCULAR; INTRAVENOUS; SUBCUTANEOUS at 23:24

## 2018-03-19 RX ADMIN — ASCORBIC ACID: 500 INJECTION, SOLUTION INTRAMUSCULAR; INTRAVENOUS; SUBCUTANEOUS at 17:17

## 2018-03-19 RX ADMIN — SODIUM CHLORIDE, POTASSIUM CHLORIDE, SODIUM LACTATE AND CALCIUM CHLORIDE 100 ML/HR: 600; 310; 30; 20 INJECTION, SOLUTION INTRAVENOUS at 08:30

## 2018-03-19 RX ADMIN — ENOXAPARIN SODIUM 40 MG: 40 INJECTION SUBCUTANEOUS at 20:12

## 2018-03-19 RX ADMIN — THIAMINE HYDROCHLORIDE 200 MG: 100 INJECTION, SOLUTION INTRAMUSCULAR; INTRAVENOUS at 02:41

## 2018-03-19 RX ADMIN — INSULIN ASPART 3 UNITS: 100 INJECTION, SOLUTION INTRAVENOUS; SUBCUTANEOUS at 13:39

## 2018-03-19 RX ADMIN — THIAMINE HYDROCHLORIDE 200 MG: 100 INJECTION, SOLUTION INTRAMUSCULAR; INTRAVENOUS at 08:21

## 2018-03-19 RX ADMIN — DOPAMINE HYDROCHLORIDE 12 MCG/KG/MIN: 40 INJECTION, SOLUTION, CONCENTRATE INTRAVENOUS at 12:04

## 2018-03-19 RX ADMIN — ASCORBIC ACID: 500 INJECTION, SOLUTION INTRAMUSCULAR; INTRAVENOUS; SUBCUTANEOUS at 00:23

## 2018-03-19 RX ADMIN — HYDROCORTISONE SODIUM SUCCINATE 50 MG: 100 INJECTION, POWDER, FOR SOLUTION INTRAMUSCULAR; INTRAVENOUS at 08:21

## 2018-03-19 RX ADMIN — ASCORBIC ACID: 500 INJECTION, SOLUTION INTRAMUSCULAR; INTRAVENOUS; SUBCUTANEOUS at 11:02

## 2018-03-19 RX ADMIN — TAZOBACTAM SODIUM AND PIPERACILLIN SODIUM 3.38 G: 375; 3 INJECTION, SOLUTION INTRAVENOUS at 03:44

## 2018-03-19 RX ADMIN — ACETAMINOPHEN 650 MG: 325 TABLET ORAL at 02:12

## 2018-03-19 RX ADMIN — THIAMINE HYDROCHLORIDE 200 MG: 100 INJECTION, SOLUTION INTRAMUSCULAR; INTRAVENOUS at 20:13

## 2018-03-19 RX ADMIN — AZITHROMYCIN 500 MG: 500 INJECTION, POWDER, LYOPHILIZED, FOR SOLUTION INTRAVENOUS at 20:54

## 2018-03-19 RX ADMIN — HYDROCORTISONE SODIUM SUCCINATE 50 MG: 100 INJECTION, POWDER, FOR SOLUTION INTRAMUSCULAR; INTRAVENOUS at 13:39

## 2018-03-19 RX ADMIN — TAZOBACTAM SODIUM AND PIPERACILLIN SODIUM 3.38 G: 375; 3 INJECTION, SOLUTION INTRAVENOUS at 11:02

## 2018-03-19 NOTE — PAYOR COMM NOTE
"Gilson Sethi (62 y.o. Male)               ATTENTION; PENDING REF [id number].  CLINICALS FOR YOUR REVIEW, REPLY TO UR DEPT, TESS             URI N  OR UR  [phone]         Date of Birth Social Security Number Address Home Phone AMRITA    1955  [address] [address] [phone] [id number]    Shinto Marital Status          None        Admission Date Admission Type Admitting Provider Attending Provider Department, Room/Bed    3/18/18 Urgent Teresa Davenport MD Saad, Lebnan S, MD [facility], 374/1    Discharge Date Discharge Disposition Discharge Destination                       Attending Provider:  Teresa Davenport MD    Allergies:  No Known Allergies    Isolation:  None   Infection:  None   Code Status:  FULL    Ht:  172.7 cm (68\")   Wt:  73.6 kg (162 lb 4.1 oz)    Admission Cmt:  [employer]   Principal Problem:  None                Active Insurance as of 3/18/2018     Patient has no active insurance coverage on file for 3/18/2018.          Emergency Contacts      (Rel.) Home Phone Work Phone Mobile Phone    Marcie Sethi (Spouse) [phone] -- [phone]    Alex Sethi (Brother) -- -- [phone]               History & Physical      Henrique Hemphill MD at 3/18/2018  7:53 PM          [facility]    CC: UTO (ems called for unresponsiveness)    HPI:  Mr. Sethi is a 63yo AAM with a history of Sarcoidosis diagnosed by Dr. Baum back in Jan of this year.  He has been on methotrexate and prednisone at home.  He had sudden onset of cough and diarrhea three days ago and felt sick in general. He had fever.  He passed out in the bathroom and struck his head and was brought to the ER at [facility].  He was hypotensive there and started on dopamine. He got zosyn and 3+liters of fluid as well it looks like.     Past Medical History:   Diagnosis Date   • Dyspnea on exertion    • Hypertension    • " Lymphadenopathy    • Pulmonary fibrosis    Sarcoidosis    Family History   Problem Relation Age of Onset   • Prostate cancer Father    • Prostate cancer Brother      Social History     Social History   • Marital status:      Social History Main Topics   • Smoking status: Former Smoker      Comment: Quit 45 years ago   • Alcohol use Yes      Comment: Rare   • Drug use: Unknown     Other Topics Concern   • Not on file     MEDS: reviewed  ALL: NKDA  ROS: 10 point negative except as in hpi    Vital Sign Min/Max for last 24 hours  Temp  Min: 98.9 °F (37.2 °C)  Max: 98.9 °F (37.2 °C)   BP  Min: 98/55  Max: 129/106   Pulse  Min: 111  Max: 112   No Data Recorded   SpO2  Min: 91 %  Max: 92 %   No Data Recorded   Weight  Min: 73.3 kg (161 lb 9.6 oz)  Max: 73.3 kg (161 lb 9.6 oz)     GEN:  appears ill, AxOx3  HEENT: PERRL, EOMI, no icterus, mmm, no jvd, trachea midline, neck supple  CHEST: decreased bilat, no wheezes, + crackles more on the left, no use of accessory muscles  CV: tachy, regualr, no m/g/r  ABD: soft, nt, nd +bs, no hepatosplenomegaly  EXT: no c/c/e; normal capillary refill; pulses are 2+  SKIN: no rashes, no xanthomas, nl turgor  LYMPH: no palpable cervical or supraclavicular lymphadenopathy  NEURO: CN 2-12 intact and symmetric bilaterally  PSYCH: nl affect, nl orientation, nl judgement, nl mood  MSK: no kyphoscoliosis, 5/5 strength ue and le bilaterally    Labs:  7.38/38/73  Lactic 4.3  Na 134  Bicarb 23  k 3.9  Bun 33  Cr 2.18  Trop 0.05  Wbc 1.3  hgb 13  plts 168    CXR: (per report) left sided infiltrate    A/P:  1. Sepsis with septic shock -- continue lactated ringers.  Will add stress dose hydrocortisone and fludricortisone.  Continue dopamine for now.  If unable to titrate off will need to place cental line and consider switch to levophed.   2. Pneumonia -- check infectious studies, continue zosyn; add zithromax;   3. Sarcoidosis -- he is on chronic prednisone and methotrexate; hold methotrexate  "for now, might need to reconsider this agent given leukopenia  4. BALDEV -- iv fluids, support bp and avoid nephrotoxins. Monitor urine output  5. Acute hypoxemic respiratory failure -- continue oxygen  6. Lactic acidosiss - support bp, continue to trend  7. Hyponatremia  8. Leukopenia - 2/2 methotrexate vs. Infection.  Continue to monitor, hold methotrexate.    D/w family and with RN     CC 43 mins.       Electronically signed by Henrique Hemphill MD at 3/18/2018  9:55 PM       Lines, Drains & Airways    Active LDAs     Name:   Placement date:   Placement time:   Site:   Days:    Peripheral IV 03/19/18 0730 Left Antecubital  03/19/18 0730 present on initial assessment  Antecubital    less than 1    Peripheral IV 03/19/18 0730 Right Forearm  03/19/18 0730 present on initaila assessment  Forearm    less than 1         Inactive LDAs     Name:   Placement date:   Placement time:   Removal date:   Removal time:   Site:   Days:    [REMOVED] Retired Airway 01/11/18 0931  01/11/18 0931 created via procedure documentation  03/19/18    0625      67                Hospital Medications (all)       Dose Frequency Start End    acetaminophen (TYLENOL) suppository 650 mg 650 mg Every 4 Hours PRN 3/18/2018     Sig - Route: Insert 1 suppository into the rectum Every 4 (Four) Hours As Needed for Fever (temperature greater than 101.5 F or headache). - Rectal    Linked Group 1:  \"Or\" Linked Group Details        acetaminophen (TYLENOL) tablet 650 mg 650 mg Every 4 Hours PRN 3/18/2018     Sig - Route: Take 2 tablets by mouth Every 4 (Four) Hours As Needed for Headache or Fever (fever greater than 101.5 F). - Oral    Linked Group 1:  \"Or\" Linked Group Details        ascorbic acid 1,500 mg in sodium chloride 0.9 % 100 mL IVPB  Every 6 Hours 3/18/2018 3/19/2018    Sig - Route: Infuse  into a venous catheter Every 6 (Six) Hours. - Intravenous    azithromycin (ZITHROMAX) 500 mg in sodium chloride 0.9 % 250 mL IVPB 500 mg Every 24 Hours " 3/18/2018 3/23/2018    Sig - Route: Infuse 500 mg into a venous catheter Daily. - Intravenous    dextrose (D50W) solution 25 g 25 g Every 15 Minutes PRN 3/18/2018     Sig - Route: Infuse 50 mL into a venous catheter Every 15 (Fifteen) Minutes As Needed for Low Blood Sugar (Blood Sugar Less Than 70). - Intravenous    dextrose (GLUTOSE) oral gel 15 g 15 g Every 15 Minutes PRN 3/18/2018     Sig - Route: Take 15 g by mouth Every 15 (Fifteen) Minutes As Needed for Low Blood Sugar (Blood sugar less than 70). - Oral    DOPamine (INTROPIN) 800 mg in sodium chloride 0.9 % 250 mL (3.2 mg/mL) infusion 2-20 mcg/kg/min × 73.3 kg Titrated 3/18/2018 3/24/2018    Sig - Route: Infuse 146.6-1,466 mcg/min into a venous catheter Dose Adjusted By Provider As Needed. - Intravenous    enoxaparin (LOVENOX) syringe 40 mg 40 mg Every 24 Hours 3/18/2018     Sig - Route: Inject 0.4 mL under the skin Daily. - Subcutaneous    famotidine (PEPCID) tablet 20 mg 20 mg 2 Times Daily 3/19/2018     Sig - Route: Take 1 tablet by mouth 2 (Two) Times a Day. - Oral    fludrocortisone tablet 50 mcg 50 mcg Daily 3/18/2018     Sig - Route: Take 0.5 tablets by mouth Daily. - Oral    glucagon (human recombinant) (GLUCAGEN DIAGNOSTIC) injection 1 mg 1 mg As Needed 3/18/2018     Sig - Route: Inject 1 mg under the skin As Needed (Blood Glucose Less Than 70). - Subcutaneous    hydrocortisone sodium succinate (Solu-CORTEF) injection 50 mg 50 mg Every 6 Hours 3/18/2018     Sig - Route: Infuse 50 mg into a venous catheter Every 6 (Six) Hours. - Intravenous    insulin aspart (novoLOG) injection 0-14 Units 0-14 Units Every 6 Hours 3/18/2018     Sig - Route: Inject 0-14 Units under the skin Every 6 (Six) Hours. - Subcutaneous    ipratropium-albuterol (DUO-NEB) nebulizer solution 3 mL 3 mL Every 2 Hours PRN 3/18/2018     Sig - Route: Take 3 mL by nebulization Every 2 (Two) Hours As Needed for Wheezing or Shortness of Air. - Nebulization    ipratropium-albuterol (DUO-NEB)  "nebulizer solution 3 mL 3 mL Every 4 Hours PRN 3/18/2018 3/23/2018    Sig - Route: Take 3 mL by nebulization Every 4 (Four) Hours As Needed for Shortness of Air. - Nebulization    lactated ringers bolus 1,000 mL 1,000 mL Once 3/18/2018 3/19/2018    Sig - Route: Infuse 1,000 mL into a venous catheter 1 (One) Time. - Intravenous    lactated ringers infusion 100 mL/hr Continuous 3/18/2018     Sig - Route: Infuse 100 mL/hr into a venous catheter Continuous. - Intravenous    ondansetron (ZOFRAN) injection 4 mg 4 mg Every 6 Hours PRN 3/18/2018     Sig - Route: Infuse 2 mL into a venous catheter Every 6 (Six) Hours As Needed for Nausea or Vomiting. - Intravenous    Linked Group 2:  \"Or\" Linked Group Details        ondansetron (ZOFRAN) tablet 4 mg 4 mg Every 6 Hours PRN 3/18/2018     Sig - Route: Take 1 tablet by mouth Every 6 (Six) Hours As Needed for Nausea or Vomiting. - Oral    Linked Group 2:  \"Or\" Linked Group Details        ondansetron ODT (ZOFRAN-ODT) disintegrating tablet 4 mg 4 mg Every 6 Hours PRN 3/18/2018     Sig - Route: Take 1 tablet by mouth Every 6 (Six) Hours As Needed for Nausea or Vomiting. - Oral    Linked Group 2:  \"Or\" Linked Group Details        Pharmacy to Dose Zosyn  Continuous PRN 3/18/2018 3/25/2018    Sig - Route: Continuous As Needed (pna). - Does not apply    piperacillin-tazobactam (ZOSYN) 3.375 g in iso-osmotic dextrose 50 ml (premix) 3.375 g Once 3/18/2018 3/18/2018    Sig - Route: Infuse 50 mL into a venous catheter 1 (One) Time. - Intravenous    piperacillin-tazobactam (ZOSYN) 3.375 g in iso-osmotic dextrose 50 ml (premix) 3.375 g Every 8 Hours 3/19/2018 3/26/2018    Sig - Route: Infuse 50 mL into a venous catheter Every 8 (Eight) Hours. - Intravenous    potassium chloride (KLOR-CON) packet 40 mEq 40 mEq As Needed 3/18/2018     Sig - Route: Take 40 mEq by mouth As Needed (potassium replacement, see admin instructions). - Oral    Linked Group 3:  \"Or\" Linked Group Details        potassium " "chloride (MICRO-K) CR capsule 40 mEq 40 mEq As Needed 3/18/2018     Sig - Route: Take 4 capsules by mouth As Needed (potassium replacement.  see admin instructions). - Oral    Linked Group 3:  \"Or\" Linked Group Details        potassium chloride 10 mEq in 100 mL IVPB 10 mEq Every 1 Hour PRN 3/18/2018     Sig - Route: Infuse 100 mL into a venous catheter Every 1 (One) Hour As Needed (potassium protocol PERIPHERAL - see admin instructions). - Intravenous    Linked Group 3:  \"Or\" Linked Group Details        sennosides-docusate sodium (SENOKOT-S) 8.6-50 MG tablet 2 tablet 2 tablet Nightly 3/18/2018     Sig - Route: Take 2 tablets by mouth Every Night. - Oral    sodium chloride 0.9 % flush 1-10 mL 1-10 mL As Needed 3/18/2018     Sig - Route: Infuse 1-10 mL into a venous catheter As Needed for Line Care. - Intravenous    thiamine (B-1) 200 mg in sodium chloride 0.9 % 100 mL IVPB 200 mg 2 Times Daily 3/18/2018 3/22/2018    Sig - Route: Infuse 200 mg into a venous catheter 2 (Two) Times a Day. - Intravenous    ascorbic acid injection 1,500 mg (Discontinued) 1,500 mg Every 6 Hours 3/18/2018 3/18/2018    Sig - Route: Infuse 3 mL into a venous catheter Every 6 (Six) Hours. - Intravenous    Reason for Discontinue: *Re-Entry          Orders (last 72 hrs)     Start     Ordered    03/20/18 0600  XR Chest 1 View  1 Time Imaging      03/19/18 0935    03/20/18 0600  Basic Metabolic Panel  Morning Draw      03/19/18 0935    03/20/18 0600  CBC (No Diff)  Morning Draw      03/19/18 0935    03/19/18 1045  XR Chest 1 View  1 Time Imaging      03/19/18 1044    03/19/18 1015  famotidine (PEPCID) tablet 20 mg  2 Times Daily      03/19/18 0936    03/19/18 0757  POC Glucose Once  Once      03/19/18 0756    03/19/18 0647  Scan Slide  Once      03/19/18 0646    03/19/18 0600  CBC & Differential  Morning Draw      03/1956    03/19/18 0600  Comprehensive Metabolic Panel  Morning Draw      03/1956 03/19/18 0600  CBC Auto Differential  " PROCEDURE ONCE      03/19/18 0002    03/19/18 0446  POC Glucose Once  Once      03/19/18 0445    03/19/18 0400  piperacillin-tazobactam (ZOSYN) 3.375 g in iso-osmotic dextrose 50 ml (premix)  Every 8 Hours      03/18/18 2355    03/19/18 0241  POC Glucose Once  Once      03/19/18 0240    03/19/18 0205  Lactic Acid, Reflex  STAT      03/19/18 0204    03/19/18 0119  Diet Regular; Low Microbial / Neutropenic  Diet Effective Now      03/19/18 0126    03/19/18 0000  Lactic Acid, Plasma  Every 6 Hours,   Status:  Canceled      03/18/18 1958 03/18/18 2345  ascorbic acid 1,500 mg in sodium chloride 0.9 % 100 mL IVPB  Every 6 Hours      03/18/18 2311 03/18/18 2330  ascorbic acid injection 1,500 mg  Every 6 Hours,   Status:  Discontinued      03/18/18 2258 03/18/18 2330  thiamine (B-1) 200 mg in sodium chloride 0.9 % 100 mL IVPB  2 Times Daily      03/18/18 2258    03/18/18 2315  DOPamine (INTROPIN) 800 mg in sodium chloride 0.9 % 250 mL (3.2 mg/mL) infusion  Titrated      03/18/18 2235    03/18/18 2315  lactated ringers bolus 1,000 mL  Once      03/18/18 2235    03/18/18 2312  Manual Differential  Once      03/18/18 2311    03/18/18 2251  Lactic Acid, Reflex Timer (This will reflex a repeat order 3-3:15 hours after ordered.)  Once      03/18/18 2250 03/18/18 2241  Scan Slide  Once      03/18/18 2240 03/18/18 2100  sennosides-docusate sodium (SENOKOT-S) 8.6-50 MG tablet 2 tablet  Nightly      03/1956 03/18/18 2100  azithromycin (ZITHROMAX) 500 mg in sodium chloride 0.9 % 250 mL IVPB  Every 24 Hours      03/18/18 2021 03/18/18 2100  piperacillin-tazobactam (ZOSYN) 3.375 g in iso-osmotic dextrose 50 ml (premix)  Once      03/18/18 2024 03/18/18 2033  ipratropium-albuterol (DUO-NEB) nebulizer solution 3 mL  Every 4 Hours PRN      03/18/18 2033 03/18/18 2030  enoxaparin (LOVENOX) syringe 40 mg  Every 24 Hours      03/1956 03/18/18 2030  lactated ringers infusion  Continuous      03/18/18  1956/18 2030  insulin aspart (novoLOG) injection 0-14 Units  Every 6 Hours      03/1956 03/18/18 2030  hydrocortisone sodium succinate (Solu-CORTEF) injection 50 mg  Every 6 Hours      03/18/18 1958 03/18/18 2030  fludrocortisone tablet 50 mcg  Daily      03/18/18 1958 03/18/18 2020  Pharmacy to Dose Zosyn  Continuous PRN      03/18/18 2021 03/18/18 2000  POC Glucose Q4H  Every 4 Hours      03/1956 03/18/18 2000  Legionella Antigen, Urine - Urine, Urine, Clean Catch  Once      03/18/18 1959 03/18/18 1959  Respiratory Panel, PCR - Swab, Nasopharynx  Once      03/18/18 1959 03/18/18 1959  S. Pneumo Ag Urine or CSF - Urine, Urine, Clean Catch  Once      03/18/18 1959 03/18/18 1957  Daily Weights  Daily      03/1956 03/1956  ondansetron (ZOFRAN) tablet 4 mg  Every 6 Hours PRN      03/1956 03/1956  ondansetron ODT (ZOFRAN-ODT) disintegrating tablet 4 mg  Every 6 Hours PRN      03/1956 03/1956  ondansetron (ZOFRAN) injection 4 mg  Every 6 Hours PRN      03/1956 03/1956  potassium chloride (MICRO-K) CR capsule 40 mEq  As Needed      03/1956 03/1956  potassium chloride (KLOR-CON) packet 40 mEq  As Needed      03/1956 03/1956  potassium chloride 10 mEq in 100 mL IVPB  Every 1 Hour PRN      03/1956 03/1956  CBC Auto Differential  STAT      03/1956 03/1956  Comprehensive Metabolic Panel  STAT      03/1956 03/1956  Lactic Acid, Plasma  STAT      03/1956 03/1956  Respiratory Culture - Sputum, Cough  Once      03/1956 03/18/18 1955  acetaminophen (TYLENOL) tablet 650 mg  Every 4 Hours PRN      03/1956 03/18/18 1955  acetaminophen (TYLENOL) suppository 650 mg  Every 4 Hours PRN      03/1956 03/18/18 1955  Inpatient Admission  Once      03/1956 03/18/18 1955  Full Code  Continuous      03/1956     03/18/18 1955  Vital Signs Per hospital policy  Per Hospital Policy      03/1956 03/18/18 1955  Cardiac Monitoring  Continuous      03/1956 03/18/18 1955  Continuous Pulse Oximetry  Continuous,   Status:  Canceled      03/1956 03/18/18 1955  Use Mobility Guidelines for Advancement of Activity  Until Discontinued      03/1956 03/18/18 1955  Follow [facility] Hypoglycemia Protocol For Blood Glucose Less Than 70 mg/dL  Until Discontinued      03/1956 03/18/18 1955  Hypoglycemia Treatment - Alert Patient That is Not NPO and Can Safely Swallow  Until Discontinued     Comments:  Administer 4 oz Fruit Juice OR 4 oz Regular Soda OR 8 oz Milk OR 15-30 grams (1 tube) of Glucose Gel.  Recheck Blood Glucose 15 Minutes After Ingestion, Repeat Treatment & Continue to Recheck Blood Sugar Every 15 Minutes Until Blood Glucose is 70 mg/dL or Higher.  Once Blood Glucose is 70 mg/dL or Higher and if It Will Be more than 60 Minutes Until the Next Meal, Provide Appropriate Snack (Including Carbohydrate Food) Based on Meal Plan Order. Give Meal Tray As Soon As Possible.    03/1956 03/18/18 1955  Hypoglycemia Treatment - Patient Has IV Access - Unresponsive, NPO or Unable To Safely Swallow  Until Discontinued     Comments:  Administer 25g (50ml) D50W IV Push.  Recheck Blood Glucose 15 Minutes After Administration, if Blood Glucose Remains Less Than 70 mg/dl, Repeat Treatment   Recheck Blood Glucose 15 Minutes After 2nd Administration, if Blood Glucose Remains Less Than 70 mg/dL After 2nd Dose of D50W, Contact Provider for Further Treatment Orders & Consider Adding IVF With D5W for Maintenance    03/1956 03/18/18 1955  Hypoglycemia Treatment - Patient Without IV Access - Unresponsive, NPO or Unable To Safely Swallow  Until Discontinued     Comments:  Administer 1mg Glucagon SQ & Establish IV Access.  Turn Patient on Side - Nausea / Vomiting May Occur.  Recheck Blood Glucose 15 Minutes  After Administration.  If Blood Glucose Remains Less Than 70, Administer 25g D50W IV Push (50ml).  Recheck Blood Glucose 15 Minutes After Administration of D50W, if Blood Glucose Remains Less Than 70 mg/dl, Contact Provider for Further Treatment Orders & Consider Adding IVF With D5 for Maintenance    03/1956 03/18/18 1955  Notify Provider of event. Communicate needs and document in EMR.  Once      03/1956 03/18/18 1955  Document event and patients response to treatment in EMR, any medications given to be documented on MAR.  Once      03/1956 03/18/18 1955  Height and weight  Once      03/1956 03/18/18 1955  Intake and Output  Every Shift      03/1956 03/18/18 1955  If Patient has BG of < 80 and is symptomatic but not on an IV insulin protocol then use the Adult Hypoglycemia Treatment Orders.  Once      03/1956 03/18/18 1955  POC Glucose Once  Once     Comments:  On arrival to unit. If >140, call admitting MD for orders.      03/1956 03/18/18 1955  Tobacco cessation education  Once      03/1956 03/18/18 1955  Saline Lock  Continuous,   Status:  Canceled     Comments:  For standing ICU/CCU standing orders    03/1956 03/18/18 1955  Oxygen Therapy- Nasal Cannula; Titrate for SPO2: 90%, Greater Than or Equal To  Continuous     Comments:  For unresponsiveness, acute dyspnea, cyanosis or suspected hypoxemia    03/1956 03/18/18 1955  Continuous Pulse Oximetry  Continuous     Comments:  Unresponsiveness, Acute Dyspnea, Cyanosis, Hypoxemia    03/1956 03/18/18 1955  ACLS Protocol For Life Threatening Dysrhythmias (If Not DNR Order)  Continuous      03/1956 03/18/18 1955  Notify Provider if ACLS Protocol Activated  Once      03/1956 03/18/18 1955  Place Order to Consult Intensivist For Critical Care Management (If Patient Not Admitted to Cardiology for Primary Cardiology Condition)  Continuous     Comments:  For  standing ICU/CCU standing orders    18  Notify All Physicians When Patient is Transferred  Once     Comments:  For standing ICU/CCU standing orders    18  Insert Peripheral IV  Once      18  Saline Lock & Maintain IV Access  Continuous      18  VTE Risk Assessment - Moderate Risk  Once      18  Place Sequential Compression Device  Once      18  Maintain Sequential Compression Device  Continuous      18  Diet Regular  Diet Effective Now,   Status:  Canceled      18  dextrose (D50W) solution 25 g  Every 15 Minutes PRN      18  glucagon (human recombinant) (GLUCAGEN DIAGNOSTIC) injection 1 mg  As Needed      18  sodium chloride 0.9 % flush 1-10 mL  As Needed      18  ipratropium-albuterol (DUO-NEB) nebulizer solution 3 mL  Every 2 Hours PRN      18  dextrose (GLUTOSE) oral gel 15 g  Every 15 Minutes PRN      18 190  POC Glucose Once  Once      18 190    Unscheduled  ECG 12 Lead  As Needed     Comments:  For standing ICU/CCU Standing Orders.  Nurse to Release if Patient Experiences Acute Chest Pain or Dysrhythmias    18    Unscheduled  Potassium  As Needed     Comments:  Sudden Ventricular Dysrhythmias. Notify Physician.      18    Unscheduled  Magnesium  As Needed     Comments:  For ICU/CCU Standing Orders      18    Unscheduled  Magnesium  As Needed      18    Unscheduled  Potassium  As Needed      18             Physician Progress Notes       Teresa Davenport MD at 3/19/2018  9:10 AM            PROGRESS NOTE  Patient Name: Gilson Sethi  Age/Sex: 62 y.o. male  : 1955  MRN: [id number]    Date of  Admission: 3/18/2018  Date of Encounter Visit: 03/19/18   LOS: 1 day   Patient Care Team:  Zach Chi DO as PCP - General (Family Medicine)    Chief Complaint: Respiratory failure transferred from an outlying hospital for management    Interval History: 62-year-old gentleman with history of sarcoidosis followed by Dr. Den Baum who was being treated with methotrexate and prednisone.  He developed progressive cough and shortness of breath and ended up in the emergency room after he fainted and was found to be hypotensive and septic shock started on dopamine and Zosyn and transferred to [facility] for further care.  Upon arrival he was started on stress dose steroid Zosyn was continued and Zithromax was added and methotrexate with help.  He was found to be in acute renal failure with acute hypoxemia and lactic acidosis with hyponatremia and leukopenia.  · On 3/19/18: Patient is still on 12 jane of dopamine, he is on LR at 100 cc/h, he is on nasal cannula oxygen, he is on the stress dose of steroids, he is on antibiotic with Zosyn and the Zithromax.  He is feeling better already, he is starting to have improved urine output, still coughing with purulent secretion, no hemoptysis.  Appetite has improved, no confusion.    REVIEW OF SYSTEMS:   CARDIOVASCULAR: No chest pain, chest pressure , positive chest congestion. No palpitations or edema.   RESPIRATORY: Shortness of breath with cough and purulent secretion.   GASTROINTESTINAL: Improved appetite. No abdominal pain or blood.   HEMATOLOGIC: No bleeding or bruising.     Ventilator/Non-Invasive Ventilation Settings     Nasal cannula oxygen             Vital Signs  Temp:  [98.4 °F (36.9 °C)-103.4 °F (39.7 °C)] 98.4 °F (36.9 °C)  Heart Rate:  [] 98  Resp:  [16-20] 16  BP: ()/() 105/68  SpO2:  [90 %-97 %] 95 %  on  Flow (L/min):  [1-3] 3 Device (Oxygen Therapy): nasal cannula    Intake/Output Summary (Last 24 hours) at 03/19/18 0910  Last data  "filed at 03/19/18 0830   Gross per 24 hour   Intake           2762.1 ml   Output             1150 ml   Net           1612.1 ml     Flowsheet Rows    Flowsheet Row First Filed Value   Admission Height 172.7 cm (68\") Documented at 03/18/2018 1859   Admission Weight 73.3 kg (161 lb 9.6 oz) Documented at 03/18/2018 1859        Body mass index is 24.67 kg/m².  1    03/18/18  1859 03/19/18  0600   Weight: 73.3 kg (161 lb 9.6 oz) 73.6 kg (162 lb 4.1 oz)       Physical Exam:  GEN:  Awake and alert, on nasal cannula oxygen, able to talk in short sentences   EYES:   Sclera clear. No icterus. PERRL. Normal EOM  ENT:   External ears/nose normal, no oral lesions, no thrush, mucous membranes moist  NECK:  Supple, midline trachea, no JVD  LUNGS: Normal chest on inspection, coarse crackles on the left side was clear auscultation on the right, no wheezing, minimally labored.   CV:  Regular rhythm and rate. Normal S1/S2. No murmurs, gallops, or rubs noted.  ABD:  Soft, non-tender and non-distended. Normal bowel sounds. No guarding  EXT:  Moves all extremities well. No cyanosis. No redness. No edema.   Skin: dry, intact, no bleeding    Results Review:        Results from last 7 days  Lab Units 03/19/18  0613 03/18/18  2225   SODIUM mmol/L 139 138   POTASSIUM mmol/L 4.2 4.3   CHLORIDE mmol/L 106 104   CO2 mmol/L 19.4* 18.8*   BUN mg/dL 30* 31*   CREATININE mg/dL 1.77* 1.95*   CALCIUM mg/dL 7.6* 7.1*   AST (SGOT) U/L 21 17   ALT (SGPT) U/L 23 21   ANION GAP mmol/L 13.6 15.2   ALBUMIN g/dL 2.70* 2.40*       3/18/18  Creatinine was 2.18 at the outlying facility   lactic acid was 4.3   Troponin was 0.05    White count was 1.3                    Results from last 7 days  Lab Units 03/19/18  0613 03/18/18  2226   WBC 10*3/mm3 3.44* 0.96*   HEMOGLOBIN g/dL 12.1* 11.2*   HEMATOCRIT % 37.8* 35.8*   PLATELETS 10*3/mm3 141 139*   MCV fL 93.8 95.0   NEUTROPHIL % % 89.8*  --    LYMPHOCYTE % % 1.2*  --    MONOCYTES % % 8.7  --    EOSINOPHIL % % 0.0* "  --    BASOPHIL % % 0.0  --    IMM GRAN % % 0.0  --                    Invalid input(s): LDLCALC          Glucose   Date/Time Value Ref Range Status   03/19/2018 0755 148 (H) 70 - 130 mg/dL Final   03/19/2018 0444 157 (H) 70 - 130 mg/dL Final   03/19/2018 0239 140 (H) 70 - 130 mg/dL Final   03/18/2018 1859 77 70 - 130 mg/dL Final       Results from last 7 days  Lab Units 03/19/18  0613 03/19/18  0255 03/18/18  2226   LACTATE mmol/L 1.4 1.7 2.9*       Results from last 7 days  Lab Units 03/19/18  0301   STREP PNEUMO AG  Negative   L. PNEUMOPHILA SEROGP 1 UR AG  Negative                   Imaging:   Imaging Results (all)     None          I reviewed the patient's new clinical results.  I personally viewed and interpreted the patient's imaging results:No chest x-ray from this admission his baseline x-ray from January 2018 showed some left lower infiltrate post-bronchoscopy which is always finding.  His chest x-ray per report from the outlying facility showed the left lower lobe infiltrate and will order another one for follow-up to be done over here.        Medication Review:     IVPB builder  Intravenous Q6H   azithromycin 500 mg Intravenous Q24H   enoxaparin 40 mg Subcutaneous Q24H   fludrocortisone 50 mcg Oral Daily   hydrocortisone sodium succinate 50 mg Intravenous Q6H   insulin aspart 0-14 Units Subcutaneous Q6H   piperacillin-tazobactam 3.375 g Intravenous Q8H   sennosides-docusate sodium 2 tablet Oral Nightly   thiamine (VITAMIN B1) IVPB 200 mg Intravenous BID         DOPamine 2-20 mcg/kg/min Last Rate: 12 mcg/kg/min (03/19/18 0821)   lactated ringers 100 mL/hr Last Rate: 100 mL/hr (03/19/18 0830)   Pharmacy to Dose Zosyn         ASSESSMENT:   1. Sepsis with septic shock, on dopamine, lactic acid has normalized  2. 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 Pneumonia with acute hypoxemic respiratory failure  4. History of sarcoidosis  5. Leukopenia due to sepsis and methotrexate  6. Anticipated  steroid-induced hyperglycemia, already have orders for sliding scale insulin  7. Immunocompromised host on prednisone for almost 2 months and on methotrexate      PLAN:  Patient is improving clinically, would continue with the stress dose of steroids while on the pressors and we will wean dopamine as tolerated, no apparent arrhythmia from the dopamine at this point.  His leukopenia is improving and he is to be kept off the methotrexate and we will decide if it safe to resume it at the time of discharge meanwhile will continue with the prednisone or the IV steroids substitutes while on the stress dose.  Patient is at a risk for PCP pneumonia however the focal infiltrate and the clinical picture do not fit and he is already doing better on the current regimen of Zosyn and azithromycin, I will hold on the Bactrim for the time being for that reason but that is to be considered if we have no further clinical improvement  Keep in the ICU until patient is hemodynamically stable without any IV pressors  Advance diet as tolerated  Follow-up chest x-ray in a.m.  He is already on sliding scale insulin but his blood sugar so far is holding on and an acceptable range  Follow-up on the renal function the improvement in urine output is suggestive of further  Discussed with patient at the bedside and discussed with staff, last night records and outside records from [facility] reviewed patient is already on DVT prophylaxis but need to be on stress ulcer prophylaxis while on the high-dose steroids  Patient is on vitamin C and thiamine for assisting with the outcome of septic shock, the steroids are used for both the vitamin C thiamine steroid protocol as well as for the steroid stress dose needed in a patient with chronic steroid dependent  Patient needs to have a central IV access since he has not been able to wean off the dopamine since last night      Disposition:     Teresa Davenport MD  03/19/18  9:10 AM      Time: Critical  care 38 min      Dictated utilizing Dragon dictation:  Much of this encounter note is an electronic transcription/translation of spoken language to printed text. The electronic translation of spoken language may permit erroneous, or at times, nonsensical words or phrases to be inadvertently transcribed; Although I have reviewed the note for such errors, some may still exist.    Electronically signed by Teresa Davenport MD at 3/19/2018 10:57 AM

## 2018-03-19 NOTE — PAYOR COMM NOTE
"Gilson Sethi (62 y.o. Male)              ATTENTION;   NURSE REVIEW, AUTH PENDING [id number], PATIENT ADMITTED TO ICU LEVEL OF CARE.            REPLY TO UR DEPT, TESS GREWAL LPN  [phone] OR CALL        Date of Birth Social Security Number Address Home Phone MRN    1955  [address] [phone] [id number]    Congregation Marital Status          None        Admission Date Admission Type Admitting Provider Attending Provider Department, Room/Bed    3/18/18 Urgent Teresa Davenport MD Saad, Lebnan S, MD [facility] INTENSIVE CARE, 374/1    Discharge Date Discharge Disposition Discharge Destination                       Attending Provider:  Teresa Davenport MD    Allergies:  No Known Allergies    Isolation:  None   Infection:  None   Code Status:  FULL    Ht:  172.7 cm (68\")   Wt:  73.6 kg (162 lb 4.1 oz)    Admission Cmt:  [employer]IG[employer]   Principal Problem:  None                Active Insurance as of 3/18/2018     Primary Coverage     Payor Plan Insurance Group Employer/Plan Group    MISC COMMERCIAL MISC COMMERCIAL INS      Coverage Address Coverage Phone Number Effective From Effective To    [id number] [employer] [phone] 5/1/2014     [place] [address]       Subscriber Name Subscriber Birth Date Member ID       GILSON SETHI [id number]                 Emergency Contacts      (Rel.) Home Phone Work Phone Mobile Phone    Marcie Sethi (Spouse) [phone] -- [phone]    Alex Sethi (Brother) -- -- [phone]               History & Physical      Henrique Hemphill MD at 3/18/2018  7:53 PM          [facility]    CC: UTO (ems called for unresponsiveness)    HPI:  Mr. Sethi is a 61yo AAM with a history of Sarcoidosis diagnosed by Dr. Baum back in Jan of this year.  He has been on methotrexate and prednisone at home.  He had sudden onset of cough and diarrhea three days ago and felt sick in general. He " had fever.  He passed out in the bathroom and struck his head and was brought to the ER at [facility].  He was hypotensive there and started on dopamine. He got zosyn and 3+liters of fluid as well it looks like.     Past Medical History:   Diagnosis Date   • Dyspnea on exertion    • Hypertension    • Lymphadenopathy    • Pulmonary fibrosis    Sarcoidosis    Family History   Problem Relation Age of Onset   • Prostate cancer Father    • Prostate cancer Brother      Social History     Social History   • Marital status:      Social History Main Topics   • Smoking status: Former Smoker      Comment: Quit 45 years ago   • Alcohol use Yes      Comment: Rare   • Drug use: Unknown     Other Topics Concern   • Not on file     MEDS: reviewed  ALL: NKDA  ROS: 10 point negative except as in hpi    Vital Sign Min/Max for last 24 hours  Temp  Min: 98.9 °F (37.2 °C)  Max: 98.9 °F (37.2 °C)   BP  Min: 98/55  Max: 129/106   Pulse  Min: 111  Max: 112   No Data Recorded   SpO2  Min: 91 %  Max: 92 %   No Data Recorded   Weight  Min: 73.3 kg (161 lb 9.6 oz)  Max: 73.3 kg (161 lb 9.6 oz)     GEN:  appears ill, AxOx3  HEENT: PERRL, EOMI, no icterus, mmm, no jvd, trachea midline, neck supple  CHEST: decreased bilat, no wheezes, + crackles more on the left, no use of accessory muscles  CV: tachy, regualr, no m/g/r  ABD: soft, nt, nd +bs, no hepatosplenomegaly  EXT: no c/c/e; normal capillary refill; pulses are 2+  SKIN: no rashes, no xanthomas, nl turgor  LYMPH: no palpable cervical or supraclavicular lymphadenopathy  NEURO: CN 2-12 intact and symmetric bilaterally  PSYCH: nl affect, nl orientation, nl judgement, nl mood  MSK: no kyphoscoliosis, 5/5 strength ue and le bilaterally    Labs:  7.38/38/73  Lactic 4.3  Na 134  Bicarb 23  k 3.9  Bun 33  Cr 2.18  Trop 0.05  Wbc 1.3  hgb 13  plts 168    CXR: (per report) left sided infiltrate    A/P:  1. Sepsis with septic shock -- continue lactated ringers.  Will add stress dose  "hydrocortisone and fludricortisone.  Continue dopamine for now.  If unable to titrate off will need to place cental line and consider switch to levophed.   2. Pneumonia -- check infectious studies, continue zosyn; add zithromax;   3. Sarcoidosis -- he is on chronic prednisone and methotrexate; hold methotrexate for now, might need to reconsider this agent given leukopenia  4. BALDEV -- iv fluids, support bp and avoid nephrotoxins. Monitor urine output  5. Acute hypoxemic respiratory failure -- continue oxygen  6. Lactic acidosiss - support bp, continue to trend  7. Hyponatremia  8. Leukopenia - 2/2 methotrexate vs. Infection.  Continue to monitor, hold methotrexate.    D/w family and with RN     CC 43 mins.       Electronically signed by Henrique Hemphill MD at 3/18/2018  9:55 PM       Lines, Drains & Airways    Active LDAs     Name:   Placement date:   Placement time:   Site:   Days:    CVC Quadruple Lumen 03/19/18 Non-tunneled Right Internal jugular  03/19/18    1045    Internal jugular    less than 1    Peripheral IV 03/19/18 0730 Left Antecubital  03/19/18    0730 present on initial assessment  Antecubital    less than 1    Peripheral IV 03/19/18 0730 Right Forearm  03/19/18    0730 present on initaila assessment  Forearm    less than 1                Hospital Medications (all)       Dose Frequency Start End    acetaminophen (TYLENOL) suppository 650 mg 650 mg Every 4 Hours PRN 3/18/2018     Sig - Route: Insert 1 suppository into the rectum Every 4 (Four) Hours As Needed for Fever (temperature greater than 101.5 F or headache). - Rectal    Linked Group 1:  \"Or\" Linked Group Details        acetaminophen (TYLENOL) tablet 650 mg 650 mg Every 4 Hours PRN 3/18/2018     Sig - Route: Take 2 tablets by mouth Every 4 (Four) Hours As Needed for Headache or Fever (fever greater than 101.5 F). - Oral    Linked Group 1:  \"Or\" Linked Group Details        ascorbic acid 1,500 mg in sodium chloride 0.9 % 100 mL IVPB  Every 6 Hours " 3/18/2018 3/19/2018    Sig - Route: Infuse  into a venous catheter Every 6 (Six) Hours. - Intravenous    azithromycin (ZITHROMAX) 500 mg in sodium chloride 0.9 % 250 mL IVPB 500 mg Every 24 Hours 3/18/2018 3/23/2018    Sig - Route: Infuse 500 mg into a venous catheter Daily. - Intravenous    dextrose (D50W) solution 25 g 25 g Every 15 Minutes PRN 3/18/2018     Sig - Route: Infuse 50 mL into a venous catheter Every 15 (Fifteen) Minutes As Needed for Low Blood Sugar (Blood Sugar Less Than 70). - Intravenous    dextrose (GLUTOSE) oral gel 15 g 15 g Every 15 Minutes PRN 3/18/2018     Sig - Route: Take 15 g by mouth Every 15 (Fifteen) Minutes As Needed for Low Blood Sugar (Blood sugar less than 70). - Oral    DOPamine (INTROPIN) 800 mg in sodium chloride 0.9 % 250 mL (3.2 mg/mL) infusion 2-20 mcg/kg/min × 73.3 kg Titrated 3/18/2018 3/24/2018    Sig - Route: Infuse 146.6-1,466 mcg/min into a venous catheter Dose Adjusted By Provider As Needed. - Intravenous    enoxaparin (LOVENOX) syringe 40 mg 40 mg Every 24 Hours 3/18/2018     Sig - Route: Inject 0.4 mL under the skin Daily. - Subcutaneous    famotidine (PEPCID) tablet 20 mg 20 mg 2 Times Daily 3/19/2018     Sig - Route: Take 1 tablet by mouth 2 (Two) Times a Day. - Oral    fludrocortisone tablet 50 mcg 50 mcg Daily 3/18/2018     Sig - Route: Take 0.5 tablets by mouth Daily. - Oral    glucagon (human recombinant) (GLUCAGEN DIAGNOSTIC) injection 1 mg 1 mg As Needed 3/18/2018     Sig - Route: Inject 1 mg under the skin As Needed (Blood Glucose Less Than 70). - Subcutaneous    hydrocortisone sodium succinate (Solu-CORTEF) injection 50 mg 50 mg Every 6 Hours 3/18/2018     Sig - Route: Infuse 50 mg into a venous catheter Every 6 (Six) Hours. - Intravenous    insulin aspart (novoLOG) injection 0-14 Units 0-14 Units Every 6 Hours 3/18/2018     Sig - Route: Inject 0-14 Units under the skin Every 6 (Six) Hours. - Subcutaneous    ipratropium-albuterol (DUO-NEB) nebulizer solution  "3 mL 3 mL Every 2 Hours PRN 3/18/2018     Sig - Route: Take 3 mL by nebulization Every 2 (Two) Hours As Needed for Wheezing or Shortness of Air. - Nebulization    ipratropium-albuterol (DUO-NEB) nebulizer solution 3 mL 3 mL Every 4 Hours PRN 3/18/2018 3/23/2018    Sig - Route: Take 3 mL by nebulization Every 4 (Four) Hours As Needed for Shortness of Air. - Nebulization    lactated ringers bolus 1,000 mL 1,000 mL Once 3/18/2018 3/19/2018    Sig - Route: Infuse 1,000 mL into a venous catheter 1 (One) Time. - Intravenous    lactated ringers infusion 100 mL/hr Continuous 3/18/2018     Sig - Route: Infuse 100 mL/hr into a venous catheter Continuous. - Intravenous    ondansetron (ZOFRAN) injection 4 mg 4 mg Every 6 Hours PRN 3/18/2018     Sig - Route: Infuse 2 mL into a venous catheter Every 6 (Six) Hours As Needed for Nausea or Vomiting. - Intravenous    Linked Group 2:  \"Or\" Linked Group Details        ondansetron (ZOFRAN) tablet 4 mg 4 mg Every 6 Hours PRN 3/18/2018     Sig - Route: Take 1 tablet by mouth Every 6 (Six) Hours As Needed for Nausea or Vomiting. - Oral    Linked Group 2:  \"Or\" Linked Group Details        ondansetron ODT (ZOFRAN-ODT) disintegrating tablet 4 mg 4 mg Every 6 Hours PRN 3/18/2018     Sig - Route: Take 1 tablet by mouth Every 6 (Six) Hours As Needed for Nausea or Vomiting. - Oral    Linked Group 2:  \"Or\" Linked Group Details        Pharmacy to Dose Zosyn  Continuous PRN 3/18/2018 3/25/2018    Sig - Route: Continuous As Needed (pna). - Does not apply    piperacillin-tazobactam (ZOSYN) 3.375 g in iso-osmotic dextrose 50 ml (premix) 3.375 g Once 3/18/2018 3/18/2018    Sig - Route: Infuse 50 mL into a venous catheter 1 (One) Time. - Intravenous    piperacillin-tazobactam (ZOSYN) 3.375 g in iso-osmotic dextrose 50 ml (premix) 3.375 g Every 8 Hours 3/19/2018 3/26/2018    Sig - Route: Infuse 50 mL into a venous catheter Every 8 (Eight) Hours. - Intravenous    potassium chloride (KLOR-CON) packet 40 mEq " "40 mEq As Needed 3/18/2018     Sig - Route: Take 40 mEq by mouth As Needed (potassium replacement, see admin instructions). - Oral    Linked Group 3:  \"Or\" Linked Group Details        potassium chloride (MICRO-K) CR capsule 40 mEq 40 mEq As Needed 3/18/2018     Sig - Route: Take 4 capsules by mouth As Needed (potassium replacement.  see admin instructions). - Oral    Linked Group 3:  \"Or\" Linked Group Details        potassium chloride 10 mEq in 100 mL IVPB 10 mEq Every 1 Hour PRN 3/18/2018     Sig - Route: Infuse 100 mL into a venous catheter Every 1 (One) Hour As Needed (potassium protocol PERIPHERAL - see admin instructions). - Intravenous    Linked Group 3:  \"Or\" Linked Group Details        sennosides-docusate sodium (SENOKOT-S) 8.6-50 MG tablet 2 tablet 2 tablet Nightly 3/18/2018     Sig - Route: Take 2 tablets by mouth Every Night. - Oral    sodium chloride 0.9 % flush 1-10 mL 1-10 mL As Needed 3/18/2018     Sig - Route: Infuse 1-10 mL into a venous catheter As Needed for Line Care. - Intravenous    thiamine (B-1) 200 mg in sodium chloride 0.9 % 100 mL IVPB 200 mg 2 Times Daily 3/18/2018 3/22/2018    Sig - Route: Infuse 200 mg into a venous catheter 2 (Two) Times a Day. - Intravenous    ascorbic acid injection 1,500 mg (Discontinued) 1,500 mg Every 6 Hours 3/18/2018 3/18/2018    Sig - Route: Infuse 3 mL into a venous catheter Every 6 (Six) Hours. - Intravenous    Reason for Discontinue: *Re-Entry          Orders (last 24 hrs)     Start     Ordered    03/20/18 0600  XR Chest 1 View  1 Time Imaging      03/19/18 0935    03/20/18 0600  Basic Metabolic Panel  Morning Draw      03/19/18 0935    03/20/18 0600  CBC (No Diff)  Morning Draw      03/19/18 0935    03/19/18 1121  POC Glucose Once  Once      03/19/18 1120    03/19/18 1115  XR Chest Post CVA Port  1 Time Imaging      03/19/18 1044    03/19/18 1045  XR Chest 1 View  1 Time Imaging,   Status:  Canceled      03/19/18 1044    03/19/18 1015  famotidine (PEPCID) " tablet 20 mg  2 Times Daily      03/19/18 0936    03/19/18 0757  POC Glucose Once  Once      03/19/18 0756    03/19/18 0647  Scan Slide  Once      03/19/18 0646    03/19/18 0600  CBC & Differential  Morning Draw      03/1956    03/19/18 0600  Comprehensive Metabolic Panel  Morning Draw      03/1956    03/19/18 0600  CBC Auto Differential  PROCEDURE ONCE      03/19/18 0002    03/19/18 0446  POC Glucose Once  Once      03/19/18 0445    03/19/18 0400  piperacillin-tazobactam (ZOSYN) 3.375 g in iso-osmotic dextrose 50 ml (premix)  Every 8 Hours      03/18/18 2355    03/19/18 0241  POC Glucose Once  Once      03/19/18 0240    03/19/18 0205  Lactic Acid, Reflex  STAT      03/19/18 0204    03/19/18 0119  Diet Regular; Low Microbial / Neutropenic  Diet Effective Now      03/19/18 0126    03/19/18 0000  Lactic Acid, Plasma  Every 6 Hours,   Status:  Canceled      03/18/18 1958 03/18/18 2345  ascorbic acid 1,500 mg in sodium chloride 0.9 % 100 mL IVPB  Every 6 Hours      03/18/18 2311    03/18/18 2330  ascorbic acid injection 1,500 mg  Every 6 Hours,   Status:  Discontinued      03/18/18 2258 03/18/18 2330  thiamine (B-1) 200 mg in sodium chloride 0.9 % 100 mL IVPB  2 Times Daily      03/18/18 2258 03/18/18 2315  DOPamine (INTROPIN) 800 mg in sodium chloride 0.9 % 250 mL (3.2 mg/mL) infusion  Titrated      03/18/18 2235    03/18/18 2315  lactated ringers bolus 1,000 mL  Once      03/18/18 2235    03/18/18 2312  Manual Differential  Once      03/18/18 2311 03/18/18 2251  Lactic Acid, Reflex Timer (This will reflex a repeat order 3-3:15 hours after ordered.)  Once      03/18/18 2250    03/18/18 2241  Scan Slide  Once      03/18/18 2240    03/18/18 2100  sennosides-docusate sodium (SENOKOT-S) 8.6-50 MG tablet 2 tablet  Nightly      03/1956 03/18/18 2100  azithromycin (ZITHROMAX) 500 mg in sodium chloride 0.9 % 250 mL IVPB  Every 24 Hours      03/18/18 2021 03/18/18 2100   piperacillin-tazobactam (ZOSYN) 3.375 g in iso-osmotic dextrose 50 ml (premix)  Once      03/18/18 2024 03/18/18 2033  ipratropium-albuterol (DUO-NEB) nebulizer solution 3 mL  Every 4 Hours PRN      03/18/18 2033 03/18/18 2030  enoxaparin (LOVENOX) syringe 40 mg  Every 24 Hours      03/1956 03/18/18 2030  lactated ringers infusion  Continuous      03/1956 03/18/18 2030  insulin aspart (novoLOG) injection 0-14 Units  Every 6 Hours      03/1956 03/18/18 2030  hydrocortisone sodium succinate (Solu-CORTEF) injection 50 mg  Every 6 Hours      03/18/18 1958 03/18/18 2030  fludrocortisone tablet 50 mcg  Daily      03/18/18 1958 03/18/18 2020  Pharmacy to Dose Zosyn  Continuous PRN      03/18/18 2021 03/18/18 2000  POC Glucose Q4H  Every 4 Hours      03/1956 03/18/18 2000  Legionella Antigen, Urine - Urine, Urine, Clean Catch  Once      03/18/18 1959 03/18/18 1959  Respiratory Panel, PCR - Swab, Nasopharynx  Once      03/18/18 1959 03/18/18 1959  S. Pneumo Ag Urine or CSF - Urine, Urine, Clean Catch  Once      03/18/18 1959 03/18/18 1957  Daily Weights  Daily      03/1956 03/1956  ondansetron (ZOFRAN) tablet 4 mg  Every 6 Hours PRN      03/1956 03/1956  ondansetron ODT (ZOFRAN-ODT) disintegrating tablet 4 mg  Every 6 Hours PRN      03/1956 03/1956  ondansetron (ZOFRAN) injection 4 mg  Every 6 Hours PRN      03/1956 03/1956  potassium chloride (MICRO-K) CR capsule 40 mEq  As Needed      03/1956 03/1956  potassium chloride (KLOR-CON) packet 40 mEq  As Needed      03/1956 03/1956  potassium chloride 10 mEq in 100 mL IVPB  Every 1 Hour PRN      03/1956 03/1956  CBC Auto Differential  STAT      03/1956 03/1956  Comprehensive Metabolic Panel  STAT      03/1956 03/1956  Lactic Acid, Plasma  STAT      03/1956 03/1956   Respiratory Culture - Sputum, Cough  Once      03/1956 03/18/18 1955  acetaminophen (TYLENOL) tablet 650 mg  Every 4 Hours PRN      03/1956 03/18/18 1955  acetaminophen (TYLENOL) suppository 650 mg  Every 4 Hours PRN      03/1956 03/18/18 1955  Inpatient Admission  Once      03/1956 03/18/18 1955  Full Code  Continuous      03/1956 03/18/18 1955  Vital Signs Per hospital policy  Per Hospital Policy      03/1956 03/18/18 1955  Cardiac Monitoring  Continuous      03/1956 03/18/18 1955  Continuous Pulse Oximetry  Continuous,   Status:  Canceled      03/1956 03/18/18 1955  Use Mobility Guidelines for Advancement of Activity  Until Discontinued      03/1956 03/18/18 1955  Follow [facility] Hypoglycemia Protocol For Blood Glucose Less Than 70 mg/dL  Until Discontinued      03/1956 03/18/18 1955  Hypoglycemia Treatment - Alert Patient That is Not NPO and Can Safely Swallow  Until Discontinued     Comments:  Administer 4 oz Fruit Juice OR 4 oz Regular Soda OR 8 oz Milk OR 15-30 grams (1 tube) of Glucose Gel.  Recheck Blood Glucose 15 Minutes After Ingestion, Repeat Treatment & Continue to Recheck Blood Sugar Every 15 Minutes Until Blood Glucose is 70 mg/dL or Higher.  Once Blood Glucose is 70 mg/dL or Higher and if It Will Be more than 60 Minutes Until the Next Meal, Provide Appropriate Snack (Including Carbohydrate Food) Based on Meal Plan Order. Give Meal Tray As Soon As Possible.    03/1956 03/18/18 1955  Hypoglycemia Treatment - Patient Has IV Access - Unresponsive, NPO or Unable To Safely Swallow  Until Discontinued     Comments:  Administer 25g (50ml) D50W IV Push.  Recheck Blood Glucose 15 Minutes After Administration, if Blood Glucose Remains Less Than 70 mg/dl, Repeat Treatment   Recheck Blood Glucose 15 Minutes After 2nd Administration, if Blood Glucose Remains Less Than 70 mg/dL After 2nd Dose of D50W, Contact Provider for  Further Treatment Orders & Consider Adding IVF With D5W for Maintenance    03/1956 03/18/18 1955  Hypoglycemia Treatment - Patient Without IV Access - Unresponsive, NPO or Unable To Safely Swallow  Until Discontinued     Comments:  Administer 1mg Glucagon SQ & Establish IV Access.  Turn Patient on Side - Nausea / Vomiting May Occur.  Recheck Blood Glucose 15 Minutes After Administration.  If Blood Glucose Remains Less Than 70, Administer 25g D50W IV Push (50ml).  Recheck Blood Glucose 15 Minutes After Administration of D50W, if Blood Glucose Remains Less Than 70 mg/dl, Contact Provider for Further Treatment Orders & Consider Adding IVF With D5 for Maintenance    03/1956 03/18/18 1955  Notify Provider of event. Communicate needs and document in EMR.  Once      03/1956 03/18/18 1955  Document event and patients response to treatment in EMR, any medications given to be documented on MAR.  Once      03/1956 03/18/18 1955  Height and weight  Once      03/1956 03/18/18 1955  Intake and Output  Every Shift      03/1956 03/18/18 1955  If Patient has BG of < 80 and is symptomatic but not on an IV insulin protocol then use the Adult Hypoglycemia Treatment Orders.  Once      03/1956 03/18/18 1955  POC Glucose Once  Once     Comments:  On arrival to unit. If >140, call admitting MD for orders.      03/1956 03/18/18 1955  Tobacco cessation education  Once      03/1956 03/18/18 1955  Saline Lock  Continuous,   Status:  Canceled     Comments:  For standing ICU/CCU standing orders    03/1956 03/18/18 1955  Oxygen Therapy- Nasal Cannula; Titrate for SPO2: 90%, Greater Than or Equal To  Continuous     Comments:  For unresponsiveness, acute dyspnea, cyanosis or suspected hypoxemia    03/1956 03/18/18 1955  Continuous Pulse Oximetry  Continuous     Comments:  Unresponsiveness, Acute Dyspnea, Cyanosis, Hypoxemia    03/1956     03/18/18 1955  ACLS Protocol For Life Threatening Dysrhythmias (If Not DNR Order)  Continuous      03/1956 03/18/18 1955  Notify Provider if ACLS Protocol Activated  Once      03/1956 03/18/18 1955  Place Order to Consult Intensivist For Critical Care Management (If Patient Not Admitted to Cardiology for Primary Cardiology Condition)  Continuous     Comments:  For standing ICU/CCU standing orders    03/1956 03/18/18 1955  Notify All Physicians When Patient is Transferred  Once     Comments:  For standing ICU/CCU standing orders    03/1956 03/18/18 1955  Insert Peripheral IV  Once      03/1956 03/18/18 1955  Saline Lock & Maintain IV Access  Continuous      03/1956 03/18/18 1955  VTE Risk Assessment - Moderate Risk  Once      03/1956 03/18/18 1955  Place Sequential Compression Device  Once      03/1956 03/18/18 1955  Maintain Sequential Compression Device  Continuous      03/1956 03/18/18 1955  Diet Regular  Diet Effective Now,   Status:  Canceled      03/1956 03/18/18 1954  dextrose (D50W) solution 25 g  Every 15 Minutes PRN      03/1956 03/18/18 1954  glucagon (human recombinant) (GLUCAGEN DIAGNOSTIC) injection 1 mg  As Needed      03/1956 03/18/18 1954  sodium chloride 0.9 % flush 1-10 mL  As Needed      03/1956 03/18/18 1954  ipratropium-albuterol (DUO-NEB) nebulizer solution 3 mL  Every 2 Hours PRN      03/1956 03/18/18 1954  dextrose (GLUTOSE) oral gel 15 g  Every 15 Minutes PRN      03/1956 03/18/18 1902  POC Glucose Once  Once      03/18/18 1901    Unscheduled  ECG 12 Lead  As Needed     Comments:  For standing ICU/CCU Standing Orders.  Nurse to Release if Patient Experiences Acute Chest Pain or Dysrhythmias    03/1956    Unscheduled  Potassium  As Needed     Comments:  Sudden Ventricular Dysrhythmias. Notify Physician.      03/1956    Unscheduled  Magnesium  As  Needed     Comments:  For ICU/CCU Standing Orders      18    Unscheduled  Magnesium  As Needed      18    Unscheduled  Potassium  As Needed      18             Physician Progress Notes (last 24 hours) (Notes from 3/18/2018 11:38 AM through 3/19/2018 11:38 AM)      Teresa Davenport MD at 3/19/2018  9:10 AM            PROGRESS NOTE  Patient Name: Gilson Sethi  Age/Sex: 62 y.o. male  : 1955  MRN: [id number]    Date of Admission: 3/18/2018  Date of Encounter Visit: 18   LOS: 1 day   Patient Care Team:  Zach Chi DO as PCP - General (Family Medicine)    [facility]: [facility]    Interval History: 62-year-old gentleman with history of sarcoidosis followed by Dr. Den Baum who was being treated with methotrexate and prednisone.  He developed progressive cough and shortness of breath and ended up in the emergency room after he fainted and was found to be hypotensive and septic shock started on dopamine and Zosyn and transferred to [facility] for further care.  Upon arrival he was started on stress dose steroid Zosyn was continued and Zithromax was added and methotrexate with help.  He was found to be in acute renal failure with acute hypoxemia and lactic acidosis with hyponatremia and leukopenia.  · On 3/19/18: Patient is still on 12 jane of dopamine, he is on LR at 100 cc/h, he is on nasal cannula oxygen, he is on the stress dose of steroids, he is on antibiotic with Zosyn and the Zithromax.  He is feeling better already, he is starting to have improved urine output, still coughing with purulent secretion, no hemoptysis.  Appetite has improved, no confusion.    REVIEW OF SYSTEMS:   CARDIOVASCULAR: No chest pain, chest pressure , positive chest congestion. No palpitations or edema.   RESPIRATORY: Shortness of breath with cough and purulent secretion.   GASTROINTESTINAL: Improved appetite. No  "abdominal pain or blood.   HEMATOLOGIC: No bleeding or bruising.     Ventilator/Non-Invasive Ventilation Settings     Nasal cannula oxygen             Vital Signs  Temp:  [98.4 °F (36.9 °C)-103.4 °F (39.7 °C)] 98.4 °F (36.9 °C)  Heart Rate:  [] 98  Resp:  [16-20] 16  BP: ()/() 105/68  SpO2:  [90 %-97 %] 95 %  on  Flow (L/min):  [1-3] 3 Device (Oxygen Therapy): nasal cannula    Intake/Output Summary (Last 24 hours) at 03/19/18 0910  Last data filed at 03/19/18 0830   Gross per 24 hour   Intake           2762.1 ml   Output             1150 ml   Net           1612.1 ml     Flowsheet Rows    Flowsheet Row First Filed Value   Admission Height 172.7 cm (68\") Documented at 03/18/2018 1859   Admission Weight 73.3 kg (161 lb 9.6 oz) Documented at 03/18/2018 1859        Body mass index is 24.67 kg/m².  1    03/18/18  1859 03/19/18  0600   Weight: 73.3 kg (161 lb 9.6 oz) 73.6 kg (162 lb 4.1 oz)       Physical Exam:  GEN:  Awake and alert, on nasal cannula oxygen, able to talk in short sentences   EYES:   Sclera clear. No icterus. PERRL. Normal EOM  ENT:   External ears/nose normal, no oral lesions, no thrush, mucous membranes moist  NECK:  Supple, midline trachea, no JVD  LUNGS: Normal chest on inspection, coarse crackles on the left side was clear auscultation on the right, no wheezing, minimally labored.   CV:  Regular rhythm and rate. Normal S1/S2. No murmurs, gallops, or rubs noted.  ABD:  Soft, non-tender and non-distended. Normal bowel sounds. No guarding  EXT:  Moves all extremities well. No cyanosis. No redness. No edema.   Skin: dry, intact, no bleeding    Results Review:        Results from last 7 days  Lab Units 03/19/18  0613 03/18/18  2225   SODIUM mmol/L 139 138   POTASSIUM mmol/L 4.2 4.3   CHLORIDE mmol/L 106 104   CO2 mmol/L 19.4* 18.8*   BUN mg/dL 30* 31*   CREATININE mg/dL 1.77* 1.95*   CALCIUM mg/dL 7.6* 7.1*   AST (SGOT) U/L 21 17   ALT (SGPT) U/L 23 21   ANION GAP mmol/L 13.6 15.2 "   ALBUMIN g/dL 2.70* 2.40*       3/18/18  Creatinine was 2.18 at the outlying facility   lactic acid was 4.3   Troponin was 0.05    White count was 1.3                    Results from last 7 days  Lab Units 03/19/18  0613 03/18/18  2226   WBC 10*3/mm3 3.44* 0.96*   HEMOGLOBIN g/dL 12.1* 11.2*   HEMATOCRIT % 37.8* 35.8*   PLATELETS 10*3/mm3 141 139*   MCV fL 93.8 95.0   NEUTROPHIL % % 89.8*  --    LYMPHOCYTE % % 1.2*  --    MONOCYTES % % 8.7  --    EOSINOPHIL % % 0.0*  --    BASOPHIL % % 0.0  --    IMM GRAN % % 0.0  --                    Invalid input(s): LDLCALC          Glucose   Date/Time Value Ref Range Status   03/19/2018 0755 148 (H) 70 - 130 mg/dL Final   03/19/2018 0444 157 (H) 70 - 130 mg/dL Final   03/19/2018 0239 140 (H) 70 - 130 mg/dL Final   03/18/2018 1859 77 70 - 130 mg/dL Final       Results from last 7 days  Lab Units 03/19/18  0613 03/19/18  0255 03/18/18  2226   LACTATE mmol/L 1.4 1.7 2.9*       Results from last 7 days  Lab Units 03/19/18  0301   STREP PNEUMO AG  Negative   L. PNEUMOPHILA SEROGP 1 UR AG  Negative                   Imaging:   Imaging Results (all)     None          I reviewed the patient's new clinical results.  I personally viewed and interpreted the patient's imaging results:No chest x-ray from this admission his baseline x-ray from January 2018 showed some left lower infiltrate post-bronchoscopy which is always finding.  His chest x-ray per report from the outlying facility showed the left lower lobe infiltrate and will order another one for follow-up to be done over here.        Medication Review:     IVPB builder  Intravenous Q6H   azithromycin 500 mg Intravenous Q24H   enoxaparin 40 mg Subcutaneous Q24H   fludrocortisone 50 mcg Oral Daily   hydrocortisone sodium succinate 50 mg Intravenous Q6H   insulin aspart 0-14 Units Subcutaneous Q6H   piperacillin-tazobactam 3.375 g Intravenous Q8H   sennosides-docusate sodium 2 tablet Oral Nightly   thiamine (VITAMIN B1) IVPB 200 mg  Intravenous BID         DOPamine 2-20 mcg/kg/min Last Rate: 12 mcg/kg/min (03/19/18 0821)   lactated ringers 100 mL/hr Last Rate: 100 mL/hr (03/19/18 0830)   Pharmacy to Dose Zosyn         ASSESSMENT:   1. Sepsis with septic shock, on dopamine, lactic acid has normalized  2. 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 Pneumonia with acute hypoxemic respiratory failure  4. History of sarcoidosis  5. Leukopenia due to sepsis and methotrexate  6. Anticipated steroid-induced hyperglycemia, already have orders for sliding scale insulin  7. Immunocompromised host on prednisone for almost 2 months and on methotrexate      PLAN:  Patient is improving clinically, would continue with the stress dose of steroids while on the pressors and we will wean dopamine as tolerated, no apparent arrhythmia from the dopamine at this point.  His leukopenia is improving and he is to be kept off the methotrexate and we will decide if it safe to resume it at the time of discharge meanwhile will continue with the prednisone or the IV steroids substitutes while on the stress dose.  Patient is at a risk for PCP pneumonia however the focal infiltrate and the clinical picture do not fit and he is already doing better on the current regimen of Zosyn and azithromycin, I will hold on the Bactrim for the time being for that reason but that is to be considered if we have no further clinical improvement  Keep in the ICU until patient is hemodynamically stable without any IV pressors  Advance diet as tolerated  Follow-up chest x-ray in a.m.  He is already on sliding scale insulin but his blood sugar so far is holding on and an acceptable range  Follow-up on the renal function the improvement in urine output is suggestive of further  Discussed with patient at the bedside and discussed with staff, last night records and outside records from [facility] reviewed patient is already on DVT prophylaxis but need to be on stress ulcer prophylaxis  while on the high-dose steroids  Patient is on vitamin C and thiamine for assisting with the outcome of septic shock, the steroids are used for both the vitamin C thiamine steroid protocol as well as for the steroid stress dose needed in a patient with chronic steroid dependent  Patient needs to have a central IV access since he has not been able to wean off the dopamine since last night      Disposition:     Teresa Davenport MD  03/19/18  9:10 AM      Time: Critical care 38 min      Dictated utilizing Dragon dictation:  Much of this encounter note is an electronic transcription/translation of spoken language to printed text. The electronic translation of spoken language may permit erroneous, or at times, nonsensical words or phrases to be inadvertently transcribed; Although I have reviewed the note for such errors, some may still exist.    Electronically signed by Teresa Davenport MD at 3/19/2018 10:57 AM

## 2018-03-19 NOTE — PROGRESS NOTES
PROGRESS NOTE  Patient Name: Gilson Sethi  Age/Sex: 62 y.o. male  : 1955  MRN: [id number]    Date of Admission: 3/18/2018  Date of Encounter Visit: 18   LOS: 1 day   Patient Care Team:  Zach Chi DO as PCP - General (Family Medicine)    Chief [facility]: [facility]    Interval History: 62-year-old gentleman with history of sarcoidosis followed by Dr. Den Baum who was being treated with methotrexate and prednisone.  He developed progressive cough and shortness of breath and ended up in the emergency room after he fainted and was found to be hypotensive and septic shock started on dopamine and Zosyn and transferred to [facility] for further care.  Upon arrival he was started on stress dose steroid Zosyn was continued and Zithromax was added and methotrexate with help.  He was found to be in acute renal failure with acute hypoxemia and lactic acidosis with hyponatremia and leukopenia.  · On 3/19/18: Patient is still on 12 jane of dopamine, he is on LR at 100 cc/h, he is on nasal cannula oxygen, he is on the stress dose of steroids, he is on antibiotic with Zosyn and the Zithromax.  He is feeling better already, he is starting to have improved urine output, still coughing with purulent secretion, no hemoptysis.  Appetite has improved, no confusion.    REVIEW OF SYSTEMS:   CARDIOVASCULAR: No chest pain, chest pressure , positive chest congestion. No palpitations or edema.   RESPIRATORY: Shortness of breath with cough and purulent secretion.   GASTROINTESTINAL: Improved appetite. No abdominal pain or blood.   HEMATOLOGIC: No bleeding or bruising.     Ventilator/Non-Invasive Ventilation Settings     Nasal cannula oxygen             Vital Signs  Temp:  [98.4 °F (36.9 °C)-103.4 °F (39.7 °C)] 98.4 °F (36.9 °C)  Heart Rate:  [] 98  Resp:  [16-20] 16  BP: ()/() 105/68  SpO2:  [90 %-97 %] 95 %  on  Flow (L/min):   "[1-3] 3 Device (Oxygen Therapy): nasal cannula    Intake/Output Summary (Last 24 hours) at 03/19/18 0910  Last data filed at 03/19/18 0830   Gross per 24 hour   Intake           2762.1 ml   Output             1150 ml   Net           1612.1 ml     Flowsheet Rows    Flowsheet Row First Filed Value   Admission Height 172.7 cm (68\") Documented at 03/18/2018 1859   Admission Weight 73.3 kg (161 lb 9.6 oz) Documented at 03/18/2018 1859        Body mass index is 24.67 kg/m².  1    03/18/18  1859 03/19/18  0600   Weight: 73.3 kg (161 lb 9.6 oz) 73.6 kg (162 lb 4.1 oz)       Physical Exam:  GEN:  Awake and alert, on nasal cannula oxygen, able to talk in short sentences   EYES:   Sclera clear. No icterus. PERRL. Normal EOM  ENT:   External ears/nose normal, no oral lesions, no thrush, mucous membranes moist  NECK:  Supple, midline trachea, no JVD  LUNGS: Normal chest on inspection, coarse crackles on the left side was clear auscultation on the right, no wheezing, minimally labored.   CV:  Regular rhythm and rate. Normal S1/S2. No murmurs, gallops, or rubs noted.  ABD:  Soft, non-tender and non-distended. Normal bowel sounds. No guarding  EXT:  Moves all extremities well. No cyanosis. No redness. No edema.   Skin: dry, intact, no bleeding    Results Review:        Results from last 7 days  Lab Units 03/19/18  0613 03/18/18  2225   SODIUM mmol/L 139 138   POTASSIUM mmol/L 4.2 4.3   CHLORIDE mmol/L 106 104   CO2 mmol/L 19.4* 18.8*   BUN mg/dL 30* 31*   CREATININE mg/dL 1.77* 1.95*   CALCIUM mg/dL 7.6* 7.1*   AST (SGOT) U/L 21 17   ALT (SGPT) U/L 23 21   ANION GAP mmol/L 13.6 15.2   ALBUMIN g/dL 2.70* 2.40*       3/18/18  Creatinine was 2.18 at the outlying facility   lactic acid was 4.3   Troponin was 0.05    White count was 1.3                    Results from last 7 days  Lab Units 03/19/18  0613 03/18/18  2226   WBC 10*3/mm3 3.44* 0.96*   HEMOGLOBIN g/dL 12.1* 11.2*   HEMATOCRIT % 37.8* 35.8*   PLATELETS 10*3/mm3 141 139* "   MCV fL 93.8 95.0   NEUTROPHIL % % 89.8*  --    LYMPHOCYTE % % 1.2*  --    MONOCYTES % % 8.7  --    EOSINOPHIL % % 0.0*  --    BASOPHIL % % 0.0  --    IMM GRAN % % 0.0  --                    Invalid input(s): LDLCALC          Glucose   Date/Time Value Ref Range Status   03/19/2018 0755 148 (H) 70 - 130 mg/dL Final   03/19/2018 0444 157 (H) 70 - 130 mg/dL Final   03/19/2018 0239 140 (H) 70 - 130 mg/dL Final   03/18/2018 1859 77 70 - 130 mg/dL Final       Results from last 7 days  Lab Units 03/19/18  0613 03/19/18  0255 03/18/18  2226   LACTATE mmol/L 1.4 1.7 2.9*       Results from last 7 days  Lab Units 03/19/18  0301   STREP PNEUMO AG  Negative   L. PNEUMOPHILA SEROGP 1 UR AG  Negative                   Imaging:   Imaging Results (all)     None          I reviewed the patient's new clinical results.  I personally viewed and interpreted the patient's imaging results:No chest x-ray from this admission his baseline x-ray from January 2018 showed some left lower infiltrate post-bronchoscopy which is always finding.  His chest x-ray per report from the outlying facility showed the left lower lobe infiltrate and will order another one for follow-up to be done over here.        Medication Review:     IVPB builder  Intravenous Q6H   azithromycin 500 mg Intravenous Q24H   enoxaparin 40 mg Subcutaneous Q24H   fludrocortisone 50 mcg Oral Daily   hydrocortisone sodium succinate 50 mg Intravenous Q6H   insulin aspart 0-14 Units Subcutaneous Q6H   piperacillin-tazobactam 3.375 g Intravenous Q8H   sennosides-docusate sodium 2 tablet Oral Nightly   thiamine (VITAMIN B1) IVPB 200 mg Intravenous BID         DOPamine 2-20 mcg/kg/min Last Rate: 12 mcg/kg/min (03/19/18 0821)   lactated ringers 100 mL/hr Last Rate: 100 mL/hr (03/19/18 0830)   Pharmacy to Dose Zosyn         ASSESSMENT:   1. Sepsis with septic shock, on dopamine, lactic acid has normalized  2. 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 Pneumonia with acute  hypoxemic respiratory failure  4. History of sarcoidosis  5. Leukopenia due to sepsis and methotrexate  6. Anticipated steroid-induced hyperglycemia, already have orders for sliding scale insulin  7. Immunocompromised host on prednisone for almost 2 months and on methotrexate      PLAN:  Patient is improving clinically, would continue with the stress dose of steroids while on the pressors and we will wean dopamine as tolerated, no apparent arrhythmia from the dopamine at this point.  His leukopenia is improving and he is to be kept off the methotrexate and we will decide if it safe to resume it at the time of discharge meanwhile will continue with the prednisone or the IV steroids substitutes while on the stress dose.  Patient is at a risk for PCP pneumonia however the focal infiltrate and the clinical picture do not fit and he is already doing better on the current regimen of Zosyn and azithromycin, I will hold on the Bactrim for the time being for that reason but that is to be considered if we have no further clinical improvement  Keep in the ICU until patient is hemodynamically stable without any IV pressors  Advance diet as tolerated  Follow-up chest x-ray in a.m.  He is already on sliding scale insulin but his blood sugar so far is holding on and an acceptable range  Follow-up on the renal function the improvement in urine output is suggestive of further  Discussed with patient at the bedside and discussed with staff, last night records and outside records from [facility] reviewed patient is already on DVT prophylaxis but need to be on stress ulcer prophylaxis while on the high-dose steroids  Patient is on vitamin C and thiamine for assisting with the outcome of septic shock, the steroids are used for both the vitamin C thiamine steroid protocol as well as for the steroid stress dose needed in a patient with chronic steroid dependent  Patient needs to have a central IV access since he has not been able to  wean off the dopamine since last night      Disposition:     Teresa Davenport MD  03/19/18  9:10 AM      Time: Critical care 38 min      Dictated utilizing Dragon dictation:  Much of this encounter note is an electronic transcription/translation of spoken language to printed text. The electronic translation of spoken language may permit erroneous, or at times, nonsensical words or phrases to be inadvertently transcribed; Although I have reviewed the note for such errors, some may still exist.

## 2018-03-19 NOTE — PROGRESS NOTES
"Pharmacy Consult - [place]    Gilson Sethi is a 62 y.o. on whom pharmacy has been consulted dose syn for PNA  Pharmacy dosing per Dr Hemphill's request.         Relevant clinical data and objective history reviewed:  62 y.o. male 172.7 cm (68\")   73.3 kg (161 lb 9.6 oz)   Ideal body weight: 68.4 kg (150 lb 12.7 oz)  Adjusted ideal body weight: 70.4 kg (155 lb 1.9 oz)    Past medical history: has a past medical history of Dyspnea on exertion; Hypertension; Lymphadenopathy; and Pulmonary fibrosis.  No Known Allergies    Other Antibiotics/Anti-infectives on profile: Azithromycin        Lab Results   Component Value Date    GLUCOSE 146 (H) 03/18/2018    CALCIUM 7.1 (L) 03/18/2018     03/18/2018    K 4.3 03/18/2018    CO2 18.8 (L) 03/18/2018     03/18/2018    BUN 31 (H) 03/18/2018    CREATININE 1.95 (H) 03/18/2018    EGFRIFAFRI 43 (L) 03/18/2018    BCR 15.9 03/18/2018    ANIONGAP 15.2 03/18/2018     Lab Results   Component Value Date    WBC 0.96 (C) 03/18/2018    HGB 11.2 (L) 03/18/2018    HCT 35.8 (L) 03/18/2018    MCV 95.0 03/18/2018     (L) 03/18/2018       Estimated Creatinine Clearance: 41.2 mL/min (by C-G formula based on SCr of 1.95 mg/dL (H)).  I/O last 3 completed shifts:  In: -   Out: 100 [Urine:100]  Blood pressure 98/55, pulse 111, temperature 98.9 °F (37.2 °C), height 172.7 cm (68\"), weight 73.3 kg (161 lb 9.6 oz), SpO2 91 %.        Assessment/Plan    Will start patient on 3.375gm Q8h. Pharmacy will continue to dose and monitor.       Jesse Gonzalez, [facility]    "

## 2018-03-19 NOTE — PLAN OF CARE
Problem: Pain, Acute (Adult)  Goal: Acceptable Pain Control/Comfort Level  Outcome: Ongoing (interventions implemented as appropriate)   03/19/18 1712   Pain, Acute (Adult)   Acceptable Pain Control/Comfort Level making progress toward outcome   No pain noted    Problem: Sepsis/Septic Shock (Adult)  Goal: Signs and Symptoms of Listed Potential Problems Will be Absent, Minimized or Managed (Sepsis/Septic Shock)  Outcome: Ongoing (interventions implemented as appropriate)   03/19/18 1712   Goal/Outcome Evaluation   Problems Assessed (Sepsis) all       Problem: Fall Risk (Adult)  Goal: Absence of Fall  Outcome: Ongoing (interventions implemented as appropriate)   03/19/18 1712   Fall Risk (Adult)   Absence of Fall making progress toward outcome   Falls precautions in place, verbalizes understanding of needing assistance when ambulating      Problem: Fluid Volume Excess (Adult)  Goal: Optimal Fluid Balance  Outcome: Ongoing (interventions implemented as appropriate)   03/19/18 1712   Fluid Volume Excess (Adult)   Optimal Fluid Balance making progress toward outcome       Problem: Pneumonia (Adult)  Goal: Signs and Symptoms of Listed Potential Problems Will be Absent, Minimized or Managed (Pneumonia)  Outcome: Ongoing (interventions implemented as appropriate)   03/19/18 1712   Goal/Outcome Evaluation   Problems Assessed (Pneumonia) all   O2 stable on 2L NC currently    Problem: Nutrition, Imbalanced: Inadequate Oral Intake (Adult)  Goal: Prevent Further Weight Loss  Outcome: Ongoing (interventions implemented as appropriate)   03/19/18 1712   Nutrition, Imbalanced: Inadequate Oral Intake (Adult)   Prevent Further Weight Loss making progress toward outcome   Poor diet, encouraged to eat    Problem: Patient Care Overview  Goal: Plan of Care Review  Outcome: Ongoing (interventions implemented as appropriate)   03/19/18 1712   Coping/Psychosocial   Plan of Care Reviewed With patient;family   Plan of Care Review   Progress  no change   OTHER   Outcome Summary able to wean pressor (see MAR), central line placed, poor appetite, verbalized need to eat, but no desire to eat, urine output adequate (see flowsheets)

## 2018-03-19 NOTE — PROCEDURES
Ultrasound-guided Central Venous Catheter Insertion Procedure Note      Indications:  vascular access    Procedure Details   Informed consent was obtained for the procedure, including sedation.  Risks of lung perforation, hemorrhage, arrhythmia, and adverse drug reaction were discussed.  Ultrasound was used during the procedure to help locate the vein and minimize any accidental arterial puncture and reduce risk of any complication.    Maximum sterile technique was used including usual patient drapes, antiseptics and physician sterile garments.    Under sterile conditions the skin above the on the right internal jugular vein was prepped with Chlorhexidine and covered with a sterile drape. Local anesthesia was applied to the skin and subcutaneous tissues with lidocaine 1%. An 18-gauge needle was then inserted into the vein. A guide wire was then passed easily through the catheter. A stacy-lumen atheter was then inserted into the vessel over the guide wire. The catheter was sutured into place and dressed following sterile protocol with Biopatch placed.    Findings:  There were no changes to vital signs. All catheter ports were flushed with saline. Patient did tolerate procedure well.  Estimated blood loss was less than 5 mL  Recommendations:  CXR ordered to verify placement.      Teresa Davenport MD  3/19/2018

## 2018-03-19 NOTE — PLAN OF CARE
Problem: Pain, Acute (Adult)  Goal: Identify Related Risk Factors and Signs and Symptoms  Outcome: Ongoing (interventions implemented as appropriate)   03/19/18 0627   Pain, Acute (Adult)   Related Risk Factors (Acute Pain) patient perception;positioning     Goal: Acceptable Pain Control/Comfort Level  Outcome: Ongoing (interventions implemented as appropriate)   03/19/18 0627   Pain, Acute (Adult)   Acceptable Pain Control/Comfort Level making progress toward outcome       Problem: Sepsis/Septic Shock (Adult)  Goal: Signs and Symptoms of Listed Potential Problems Will be Absent, Minimized or Managed (Sepsis/Septic Shock)  Outcome: Ongoing (interventions implemented as appropriate)   03/19/18 0627   Goal/Outcome Evaluation   Problems Assessed (Sepsis) all   Problems Present (Sepsis) hypoperfusion/hemodynamic instability;infection progression;situational response       Problem: Fall Risk (Adult)  Goal: Identify Related Risk Factors and Signs and Symptoms  Outcome: Ongoing (interventions implemented as appropriate)   03/19/18 0627   Fall Risk (Adult)   Related Risk Factors (Fall Risk) fatigue/slow reaction;fear of falling;history of falls;slippery/uneven surfaces;environment unfamiliar   Signs and Symptoms (Fall Risk) presence of risk factors     Goal: Absence of Fall  Outcome: Ongoing (interventions implemented as appropriate)   03/19/18 0627   Fall Risk (Adult)   Absence of Fall making progress toward outcome       Problem: Fluid Volume Excess (Adult)  Goal: Identify Related Risk Factors and Signs and Symptoms  Outcome: Ongoing (interventions implemented as appropriate)   03/19/18 0627   Fluid Volume Excess (Adult)   Related Risk Factors (Fluid Volume Excess) fluid intake excessive   Signs and Symptoms (Fluid Volume Excess) blood pressure/heart rate changes;intake greater than output     Goal: Optimal Fluid Balance  Outcome: Ongoing (interventions implemented as appropriate)   03/19/18 0627   Fluid Volume Excess  (Adult)   Optimal Fluid Balance making progress toward outcome       Problem: Pneumonia (Adult)  Goal: Signs and Symptoms of Listed Potential Problems Will be Absent, Minimized or Managed (Pneumonia)  Outcome: Ongoing (interventions implemented as appropriate)   03/19/18 0627   Goal/Outcome Evaluation   Problems Assessed (Pneumonia) all   Problems Present (Pneumonia) fluid/electrolyte imbalance;infection progression       Problem: Nutrition, Imbalanced: Inadequate Oral Intake (Adult)  Goal: Identify Related Risk Factors and Signs and Symptoms  Outcome: Ongoing (interventions implemented as appropriate)   03/19/18 0627   Nutrition, Imbalanced: Inadequate Oral Intake (Adult)   Related Risk Factors (Nutrition Imbalance, Inadequate Oral Intake) appetite decreased   Signs and Symptoms (Nutrition Imbalance, Inadequate Oral Intake: Signs and Symptoms) nausea and vomiting;other (see comments)  (decreased appetite)     Goal: Improved Oral Intake  Outcome: Ongoing (interventions implemented as appropriate)   03/19/18 0627   Nutrition, Imbalanced: Inadequate Oral Intake (Adult)   Improved Oral Intake making progress toward outcome     Goal: Prevent Further Weight Loss  Outcome: Ongoing (interventions implemented as appropriate)   03/19/18 0627   Nutrition, Imbalanced: Inadequate Oral Intake (Adult)   Prevent Further Weight Loss making progress toward outcome       Problem: Patient Care Overview  Goal: Plan of Care Review  Outcome: Ongoing (interventions implemented as appropriate)   03/19/18 0627   Coping/Psychosocial   Plan of Care Reviewed With patient;spouse;sibling   Plan of Care Review   Progress improving   OTHER   Outcome Summary Patient was transferred from [facility] with PNA and sepsis. He had no urine output at [facility] but started to urinate around 1900 after being admitted here. He only urinates 100mL each time. Patient has recieved at least 6 Liters of fluid 3 from [facility] and 3 from . His WBC count is 0.96  and his absolut neutrophil count is 0.73. Patients BP was very low at [facility] so they started him on dopamine which was continued when he arrived here and we have not been able to titrate it down without a major drop in pressure. His lungs sound crackly mainly in the bases, there are faint crackles throughout the upper portions of the lungs. His lactic acid level has dropped to within normal limits, and a respiratory panel, urine micro, and nasal swab have been collected and awaiting the results.

## 2018-03-20 ENCOUNTER — APPOINTMENT (OUTPATIENT)
Dept: GENERAL RADIOLOGY | Facility: HOSPITAL | Age: 63
End: 2018-03-20

## 2018-03-20 LAB
ANION GAP SERPL CALCULATED.3IONS-SCNC: 12.8 MMOL/L
BUN BLD-MCNC: 31 MG/DL (ref 8–23)
BUN/CREAT SERPL: 20.5 (ref 7–25)
CALCIUM SPEC-SCNC: 7.6 MG/DL (ref 8.6–10.5)
CHLORIDE SERPL-SCNC: 106 MMOL/L (ref 98–107)
CO2 SERPL-SCNC: 20.2 MMOL/L (ref 22–29)
CREAT BLD-MCNC: 1.51 MG/DL (ref 0.76–1.27)
DEPRECATED RDW RBC AUTO: 77.9 FL (ref 37–54)
ERYTHROCYTE [DISTWIDTH] IN BLOOD BY AUTOMATED COUNT: 24.4 % (ref 11.5–14.5)
GFR SERPL CREATININE-BSD FRML MDRD: 57 ML/MIN/1.73
GLUCOSE BLD-MCNC: 128 MG/DL (ref 65–99)
GLUCOSE BLDC GLUCOMTR-MCNC: 123 MG/DL (ref 70–130)
GLUCOSE BLDC GLUCOMTR-MCNC: 130 MG/DL (ref 70–130)
GLUCOSE BLDC GLUCOMTR-MCNC: 168 MG/DL (ref 70–130)
HCT VFR BLD AUTO: 34.6 % (ref 40.4–52.2)
HGB BLD-MCNC: 11.1 G/DL (ref 13.7–17.6)
MAGNESIUM SERPL-MCNC: 2.1 MG/DL (ref 1.6–2.4)
MCH RBC QN AUTO: 30.2 PG (ref 27–32.7)
MCHC RBC AUTO-ENTMCNC: 32.1 G/DL (ref 32.6–36.4)
MCV RBC AUTO: 94 FL (ref 79.8–96.2)
PLATELET # BLD AUTO: 117 10*3/MM3 (ref 140–500)
PMV BLD AUTO: 10.2 FL (ref 6–12)
POTASSIUM BLD-SCNC: 3.8 MMOL/L (ref 3.5–5.2)
RBC # BLD AUTO: 3.68 10*6/MM3 (ref 4.6–6)
SODIUM BLD-SCNC: 139 MMOL/L (ref 136–145)
WBC NRBC COR # BLD: 5.71 10*3/MM3 (ref 4.5–10.7)

## 2018-03-20 PROCEDURE — 63710000001 INSULIN ASPART PER 5 UNITS: Performed by: INTERNAL MEDICINE

## 2018-03-20 PROCEDURE — 25010000002 ENOXAPARIN PER 10 MG: Performed by: INTERNAL MEDICINE

## 2018-03-20 PROCEDURE — 71045 X-RAY EXAM CHEST 1 VIEW: CPT

## 2018-03-20 PROCEDURE — 25010000002 THIAMINE PER 100 MG: Performed by: INTERNAL MEDICINE

## 2018-03-20 PROCEDURE — 83735 ASSAY OF MAGNESIUM: CPT | Performed by: INTERNAL MEDICINE

## 2018-03-20 PROCEDURE — 93010 ELECTROCARDIOGRAM REPORT: CPT | Performed by: INTERNAL MEDICINE

## 2018-03-20 PROCEDURE — 94799 UNLISTED PULMONARY SVC/PX: CPT

## 2018-03-20 PROCEDURE — 25010000002 AZITHROMYCIN PER 500 MG: Performed by: INTERNAL MEDICINE

## 2018-03-20 PROCEDURE — 93005 ELECTROCARDIOGRAM TRACING: CPT | Performed by: INTERNAL MEDICINE

## 2018-03-20 PROCEDURE — 82962 GLUCOSE BLOOD TEST: CPT

## 2018-03-20 PROCEDURE — 25010000002 PIPERACILLIN SOD-TAZOBACTAM PER 1 G: Performed by: INTERNAL MEDICINE

## 2018-03-20 PROCEDURE — 85027 COMPLETE CBC AUTOMATED: CPT | Performed by: INTERNAL MEDICINE

## 2018-03-20 PROCEDURE — 25010000002 HYDROCORTISONE SODIUM SUCCINATE 100 MG RECONSTITUTED SOLUTION: Performed by: INTERNAL MEDICINE

## 2018-03-20 PROCEDURE — 80048 BASIC METABOLIC PNL TOTAL CA: CPT | Performed by: INTERNAL MEDICINE

## 2018-03-20 RX ADMIN — TAZOBACTAM SODIUM AND PIPERACILLIN SODIUM 3.38 G: 375; 3 INJECTION, SOLUTION INTRAVENOUS at 11:43

## 2018-03-20 RX ADMIN — FAMOTIDINE 20 MG: 20 TABLET, FILM COATED ORAL at 20:18

## 2018-03-20 RX ADMIN — FAMOTIDINE 20 MG: 20 TABLET, FILM COATED ORAL at 08:38

## 2018-03-20 RX ADMIN — HYDROCORTISONE SODIUM SUCCINATE 50 MG: 100 INJECTION, POWDER, FOR SOLUTION INTRAMUSCULAR; INTRAVENOUS at 02:08

## 2018-03-20 RX ADMIN — FLUDROCORTISONE ACETATE 50 MCG: 0.1 TABLET ORAL at 08:38

## 2018-03-20 RX ADMIN — INSULIN ASPART 3 UNITS: 100 INJECTION, SOLUTION INTRAVENOUS; SUBCUTANEOUS at 17:05

## 2018-03-20 RX ADMIN — TAZOBACTAM SODIUM AND PIPERACILLIN SODIUM 3.38 G: 375; 3 INJECTION, SOLUTION INTRAVENOUS at 03:38

## 2018-03-20 RX ADMIN — ENOXAPARIN SODIUM 40 MG: 40 INJECTION SUBCUTANEOUS at 20:28

## 2018-03-20 RX ADMIN — ASCORBIC ACID: 500 INJECTION, SOLUTION INTRAMUSCULAR; INTRAVENOUS; SUBCUTANEOUS at 05:20

## 2018-03-20 RX ADMIN — HYDROCORTISONE SODIUM SUCCINATE 50 MG: 100 INJECTION, POWDER, FOR SOLUTION INTRAMUSCULAR; INTRAVENOUS at 08:39

## 2018-03-20 RX ADMIN — HYDROCORTISONE SODIUM SUCCINATE 50 MG: 100 INJECTION, POWDER, FOR SOLUTION INTRAMUSCULAR; INTRAVENOUS at 20:17

## 2018-03-20 RX ADMIN — NYSTATIN 500000 UNITS: 100000 SUSPENSION ORAL at 20:17

## 2018-03-20 RX ADMIN — NYSTATIN 500000 UNITS: 100000 SUSPENSION ORAL at 13:01

## 2018-03-20 RX ADMIN — THIAMINE HYDROCHLORIDE 200 MG: 100 INJECTION, SOLUTION INTRAMUSCULAR; INTRAVENOUS at 08:39

## 2018-03-20 RX ADMIN — TAZOBACTAM SODIUM AND PIPERACILLIN SODIUM 3.38 G: 375; 3 INJECTION, SOLUTION INTRAVENOUS at 20:18

## 2018-03-20 RX ADMIN — AZITHROMYCIN 500 MG: 500 INJECTION, POWDER, LYOPHILIZED, FOR SOLUTION INTRAVENOUS at 21:47

## 2018-03-20 RX ADMIN — SODIUM CHLORIDE, POTASSIUM CHLORIDE, SODIUM LACTATE AND CALCIUM CHLORIDE 100 ML/HR: 600; 310; 30; 20 INJECTION, SOLUTION INTRAVENOUS at 02:09

## 2018-03-20 RX ADMIN — DOCUSATE SODIUM -SENNOSIDES 2 TABLET: 50; 8.6 TABLET, COATED ORAL at 21:47

## 2018-03-20 RX ADMIN — NYSTATIN 500000 UNITS: 100000 SUSPENSION ORAL at 17:06

## 2018-03-20 NOTE — PROGRESS NOTES
PROGRESS NOTE  Patient Name: Gilson Sethi  Age/Sex: 62 y.o. male  : 1955  MRN: [id number]    Date of Admission: 3/18/2018  Date of Encounter Visit: 18   LOS: 2 days   Patient Care Team:  Zach Chi DO as PCP - General (Family Medicine)    Chief Complaint: Doing better, significant improvement compared to initial presentation    Interval History: 62-year-old gentleman with history of sarcoidosis followed by Dr. Den Baum who was being treated with methotrexate and prednisone.  He developed progressive cough and shortness of breath and ended up in the emergency room after he fainted and was found to be hypotensive and septic shock started on dopamine and Zosyn and transferred to [facility] for further care.  Upon arrival he was started on stress dose steroid Zosyn was continued and Zithromax was added and methotrexate with help.  He was found to be in acute renal failure with acute hypoxemia and lactic acidosis with hyponatremia and leukopenia.  · On 3/19/18: Patient is still on 12 jane of dopamine, he is on LR at 100 cc/h, he is on nasal cannula oxygen, he is on the stress dose of steroids, he is on antibiotic with Zosyn and the Zithromax.  He is feeling better already, he is starting to have improved urine output, still coughing with purulent secretion, no hemoptysis.  Appetite has improved, no confusion.  · On 3/20/18: Patient just came off the dopamine drip at around 10 AM, he is having good urine output no GI or  complaints.  He is off the oxygen, he has a right IJ central line, he is on the antibiotic with Zosyn and azithromycin, he is looking much better than yesterday, his white count has improved, he is on the steroids IV and he was also on the vitamin C/thiamine/mineralocorticoid as well.    REVIEW OF SYSTEMS:   CARDIOVASCULAR: No chest pain, chest pressure , positive chest congestion. No palpitations or edema.   RESPIRATORY: Improved shortness of breath with improved  "cough and less expectoration.   GASTROINTESTINAL: Improved appetite. No abdominal pain or blood.   HEMATOLOGIC: No bleeding or bruising.     Ventilator/Non-Invasive Ventilation Settings     Nasal cannula oxygen             Vital Signs  Temp:  [97.8 °F (36.6 °C)-98.5 °F (36.9 °C)] 98.4 °F (36.9 °C)  Heart Rate:  [68-99] 74  Resp:  [16-20] 20  BP: ()/(63-92) 110/81  SpO2:  [93 %-99 %] 96 %  on  Flow (L/min):  [1-3] 1 Device (Oxygen Therapy): room air    Intake/Output Summary (Last 24 hours) at 03/20/18 1139  Last data filed at 03/20/18 0511   Gross per 24 hour   Intake             2760 ml   Output              750 ml   Net             2010 ml     Flowsheet Rows    Flowsheet Row First Filed Value   Admission Height 172.7 cm (68\") Documented at 03/18/2018 1859   Admission Weight 73.3 kg (161 lb 9.6 oz) Documented at 03/18/2018 1859        Body mass index is 24.81 kg/m².  1    03/18/18  1859 03/19/18  0600 03/20/18  0511   Weight: 73.3 kg (161 lb 9.6 oz) 73.6 kg (162 lb 4.1 oz) 74 kg (163 lb 2.3 oz)       Physical Exam:  GEN:  Awake and alert, on Room air, in no distress   EYES:   Sclera clear. No icterus. PERRL. Normal EOM  ENT:   External ears/nose normal, no oral lesions, no thrush, mucous membranes moist, Positive oral thrush  NECK:  Supple, midline trachea, no JVD  LUNGS: Normal chest on inspection, positive crackles on the left side was clear auscultation on the right, no wheezing, minimally labored.  Decreased breath sounds bilaterally  CV:  Regular rhythm and rate. Normal S1/S2. No murmurs, gallops, or rubs noted.  ABD:  Soft, non-tender and non-distended. Normal bowel sounds. No guarding  EXT:  Moves all extremities well. No cyanosis. No redness. No edema.   Skin: dry, intact, no bleeding    Results Review:        Results from last 7 days  Lab Units 03/20/18  0342 03/19/18  0613 03/18/18  2225   SODIUM mmol/L 139 139 138   POTASSIUM mmol/L 3.8 4.2 4.3   CHLORIDE mmol/L 106 106 104   CO2 mmol/L 20.2* 19.4* " 18.8*   BUN mg/dL 31* 30* 31*   CREATININE mg/dL 1.51* 1.77* 1.95*   CALCIUM mg/dL 7.6* 7.6* 7.1*   AST (SGOT) U/L  --  21 17   ALT (SGPT) U/L  --  23 21   ANION GAP mmol/L 12.8 13.6 15.2   ALBUMIN g/dL  --  2.70* 2.40*       3/18/18  Creatinine was 2.18 at the outlying facility   lactic acid was 4.3   Troponin was 0.05    White count was 1.3                    Results from last 7 days  Lab Units 03/20/18  0342 03/19/18  0613 03/18/18  2226   WBC 10*3/mm3 5.71 3.44* 0.96*   HEMOGLOBIN g/dL 11.1* 12.1* 11.2*   HEMATOCRIT % 34.6* 37.8* 35.8*   PLATELETS 10*3/mm3 117* 141 139*   MCV fL 94.0 93.8 95.0   NEUTROPHIL % %  --  89.8*  --    LYMPHOCYTE % %  --  1.2*  --    MONOCYTES % %  --  8.7  --    EOSINOPHIL % %  --  0.0*  --    BASOPHIL % %  --  0.0  --    IMM GRAN % %  --  0.0  --            Results from last 7 days  Lab Units 03/20/18  0342   MAGNESIUM mg/dL 2.1           Invalid input(s): LDLCALC          Glucose   Date/Time Value Ref Range Status   03/20/2018 1114 123 70 - 130 mg/dL Final   03/19/2018 2326 110 70 - 130 mg/dL Final   03/19/2018 1510 202 (H) 70 - 130 mg/dL Final   03/19/2018 1113 179 (H) 70 - 130 mg/dL Final   03/19/2018 0755 148 (H) 70 - 130 mg/dL Final   03/19/2018 0444 157 (H) 70 - 130 mg/dL Final   03/19/2018 0239 140 (H) 70 - 130 mg/dL Final   03/18/2018 1859 77 70 - 130 mg/dL Final       Results from last 7 days  Lab Units 03/19/18  0613 03/19/18  0255 03/18/18  2226   LACTATE mmol/L 1.4 1.7 2.9*       Results from last 7 days  Lab Units 03/19/18  0305 03/19/18  0301   RESPCX  Rejected  --    GRAM STAIN RESULT  Many (4+) Epithelial cells seen  No WBCs seen  Many (4+) Mixed bacterial morphotypes seen on Gram Stain  --    STREP PNEUMO AG   --  Negative   L. PNEUMOPHILA SEROGP 1 UR AG   --  Negative           Results from last 7 days  Lab Units 03/19/18  0256   ADENOVIRUS DETECTION BY PCR  Not Detected   CORONAVIRUS 229E  Not Detected   CORONAVIRUS HKU1  Not Detected   CORONAVIRUS NL63  Not  Detected   CORONAVIRUS OC43  Not Detected   HUMAN METAPNEUMOVIRUS  Not Detected   HUMAN RHINOVIRUS/ENTEROVIRUS  Not Detected   INFLUENZA B PCR  Not Detected   PARAINFLUENZA 1  Not Detected   PARAINFLUENZA VIRUS 2  Not Detected   PARAINFLUENZA VIRUS 3  Not Detected   PARAINFLUENZA VIRUS 4  Not Detected   BORDETELLA PERTUSSIS PCR  Not Detected   CHLAMYDOPHILA PNEUMONIAE PCR  Not Detected   MYCOPLAMA PNEUMO PCR  Not Detected   INFLUENZA A PCR  Not Detected   INFLUENZA A H3  Not Detected   INFLUENZA A H1  Not Detected   RSV, PCR  Not Detected           Imaging:   Imaging Results (all)     None          I reviewed the patient's new clinical results.  I personally viewed and interpreted the patient's imaging results:Central line tip in the superior vena cava, patient have a dense left lung pneumonia which is slightly worse compared to his x-ray on initial presentation to the emergency room and this is significant change compared to his baseline films from earlier.        Medication Review:     azithromycin 500 mg Intravenous Q24H   enoxaparin 40 mg Subcutaneous Q24H   famotidine 20 mg Oral BID   hydrocortisone sodium succinate 50 mg Intravenous Q12H   insulin aspart 0-14 Units Subcutaneous Q6H   nystatin 5 mL Oral 4x Daily   piperacillin-tazobactam 3.375 g Intravenous Q8H   sennosides-docusate sodium 2 tablet Oral Nightly         DOPamine 2-20 mcg/kg/min Last Rate: Stopped (03/20/18 1023)   lactated ringers 100 mL/hr Last Rate: 100 mL/hr (03/20/18 0209)   Pharmacy to Dose Zosyn         ASSESSMENT:   1. Sepsis with septic shock  2. 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 Pneumonia with acute hypoxemic respiratory failure  4. History of sarcoidosis  5. Acute kidney injury, improving  6. Leukopenia due to sepsis and methotrexate, Resolved   7. Mild steroid-induced hyperglycemia, already have orders for sliding scale insulin  8. Immunocompromised host on prednisone for almost 2 months and on  methotrexate  9. Oral thrush      PLAN:  Patient was on the dopamine drip until earlier this morning and this has been discontinued and he has been off the drip for almost one hour.  He is on the steroid and that would be modified to every 12 hours with the plan to transition back to oral prednisone meanwhile will continue to hold on the methotrexate  His leukopenia had improved and probably that was sepsis rather than methotrexate related  Patient is on antibiotic with Zosyn and azithromycin and he is clinically responding and would continue with regimen for the time being.  Tolerating by mouth diet, good urine output, normal bowel movement, on sliding scale insulin,  As discussed earlier PCP pneumonia is unlikely given the presentation and the chest x-ray finding and he is already doing better on the current antibiotic regimen.  Patient is to be transferred to a stepdown monitored unit if he can obtain adequate lab pressure after being off the dopamine for 2 hours  We will discontinue the vitamin C/thiamine/high-dose steroid/mineralocorticoid protocol  Remove central line in a.m. if he is doing well without need for IV pressorsWe will discontinue his IV fluid as well      Discussed with the patient and with the family at the bedside      Disposition:     Teresa Davenport MD  03/20/18  11:39 AM         Dictated utilizing Dragon dictation:  Much of this encounter note is an electronic transcription/translation of spoken language to printed text. The electronic translation of spoken language may permit erroneous, or at times, nonsensical words or phrases to be inadvertently transcribed; Although I have reviewed the note for such errors, some may still exist.

## 2018-03-20 NOTE — PLAN OF CARE
Problem: Pain, Acute (Adult)  Goal: Identify Related Risk Factors and Signs and Symptoms  Outcome: Ongoing (interventions implemented as appropriate)   03/20/18 0504   Pain, Acute (Adult)   Related Risk Factors (Acute Pain) infection;disease process;psychosocial factor   Signs and Symptoms (Acute Pain) guarding/abnormal posturing/positioning;sleep pattern alteration     Goal: Acceptable Pain Control/Comfort Level  Outcome: Ongoing (interventions implemented as appropriate)   03/20/18 0504   Pain, Acute (Adult)   Acceptable Pain Control/Comfort Level making progress toward outcome       Problem: Sepsis/Septic Shock (Adult)  Goal: Signs and Symptoms of Listed Potential Problems Will be Absent, Minimized or Managed (Sepsis/Septic Shock)  Outcome: Ongoing (interventions implemented as appropriate)   03/20/18 0504   Goal/Outcome Evaluation   Problems Assessed (Sepsis) all   Problems Present (Sepsis) glycemic control impaired;situational response       Problem: Fall Risk (Adult)  Goal: Identify Related Risk Factors and Signs and Symptoms  Outcome: Ongoing (interventions implemented as appropriate)   03/20/18 0504   Fall Risk (Adult)   Related Risk Factors (Fall Risk) sleep pattern alteration;environment unfamiliar;polypharmacy   Signs and Symptoms (Fall Risk) presence of risk factors     Goal: Absence of Fall  Outcome: Ongoing (interventions implemented as appropriate)   03/20/18 0504   Fall Risk (Adult)   Absence of Fall making progress toward outcome       Problem: Fluid Volume Excess (Adult)  Goal: Identify Related Risk Factors and Signs and Symptoms  Outcome: Ongoing (interventions implemented as appropriate)    Goal: Optimal Fluid Balance  Outcome: Ongoing (interventions implemented as appropriate)   03/20/18 0504   Fluid Volume Excess (Adult)   Optimal Fluid Balance making progress toward outcome       Problem: Pneumonia (Adult)  Goal: Signs and Symptoms of Listed Potential Problems Will be Absent, Minimized or  Managed (Pneumonia)  Outcome: Ongoing (interventions implemented as appropriate)   03/20/18 0504   Goal/Outcome Evaluation   Problems Assessed (Pneumonia) all   Problems Present (Pneumonia) fluid/electrolyte imbalance;respiratory compromise       Problem: Nutrition, Imbalanced: Inadequate Oral Intake (Adult)  Goal: Identify Related Risk Factors and Signs and Symptoms  Outcome: Ongoing (interventions implemented as appropriate)   03/20/18 0504   Nutrition, Imbalanced: Inadequate Oral Intake (Adult)   Related Risk Factors (Nutrition Imbalance, Inadequate Oral Intake) appetite decreased     Goal: Improved Oral Intake  Outcome: Ongoing (interventions implemented as appropriate)   03/20/18 0504   Nutrition, Imbalanced: Inadequate Oral Intake (Adult)   Improved Oral Intake making progress toward outcome     Goal: Prevent Further Weight Loss  Outcome: Ongoing (interventions implemented as appropriate)   03/20/18 0504   Nutrition, Imbalanced: Inadequate Oral Intake (Adult)   Prevent Further Weight Loss making progress toward outcome       Problem: Patient Care Overview  Goal: Plan of Care Review  Outcome: Ongoing (interventions implemented as appropriate)   03/20/18 0504   Coping/Psychosocial   Plan of Care Reviewed With patient   Plan of Care Review   Progress improving   OTHER   Outcome Summary Pt remains in ICU. VSS. Weaning dopamine and oxygen as tolerated. no complaints of pain. slept well throughout the night. adequate UOP. will continue monitor.

## 2018-03-21 LAB
ALBUMIN SERPL-MCNC: 1.8 G/DL (ref 3.5–5.2)
ALBUMIN/GLOB SERPL: 0.5 G/DL
ALP SERPL-CCNC: 62 U/L (ref 39–117)
ALT SERPL W P-5'-P-CCNC: 15 U/L (ref 1–41)
ANION GAP SERPL CALCULATED.3IONS-SCNC: 13.7 MMOL/L
AST SERPL-CCNC: 13 U/L (ref 1–40)
BILIRUB SERPL-MCNC: 0.4 MG/DL (ref 0.1–1.2)
BUN BLD-MCNC: 42 MG/DL (ref 8–23)
BUN/CREAT SERPL: 23.6 (ref 7–25)
CALCIUM SPEC-SCNC: 8.3 MG/DL (ref 8.6–10.5)
CHLORIDE SERPL-SCNC: 109 MMOL/L (ref 98–107)
CO2 SERPL-SCNC: 19.3 MMOL/L (ref 22–29)
CREAT BLD-MCNC: 1.78 MG/DL (ref 0.76–1.27)
DEPRECATED RDW RBC AUTO: 78.3 FL (ref 37–54)
ERYTHROCYTE [DISTWIDTH] IN BLOOD BY AUTOMATED COUNT: 24.7 % (ref 11.5–14.5)
GFR SERPL CREATININE-BSD FRML MDRD: 47 ML/MIN/1.73
GLOBULIN UR ELPH-MCNC: 3.6 GM/DL
GLUCOSE BLD-MCNC: 134 MG/DL (ref 65–99)
GLUCOSE BLDC GLUCOMTR-MCNC: 109 MG/DL (ref 70–130)
GLUCOSE BLDC GLUCOMTR-MCNC: 128 MG/DL (ref 70–130)
GLUCOSE BLDC GLUCOMTR-MCNC: 139 MG/DL (ref 70–130)
GLUCOSE BLDC GLUCOMTR-MCNC: 146 MG/DL (ref 70–130)
GLUCOSE BLDC GLUCOMTR-MCNC: 148 MG/DL (ref 70–130)
HCT VFR BLD AUTO: 35.3 % (ref 40.4–52.2)
HGB BLD-MCNC: 11.4 G/DL (ref 13.7–17.6)
MCH RBC QN AUTO: 30.2 PG (ref 27–32.7)
MCHC RBC AUTO-ENTMCNC: 32.3 G/DL (ref 32.6–36.4)
MCV RBC AUTO: 93.6 FL (ref 79.8–96.2)
PLATELET # BLD AUTO: 103 10*3/MM3 (ref 140–500)
PMV BLD AUTO: 10.3 FL (ref 6–12)
POTASSIUM BLD-SCNC: 3.9 MMOL/L (ref 3.5–5.2)
PROT SERPL-MCNC: 5.4 G/DL (ref 6–8.5)
RBC # BLD AUTO: 3.77 10*6/MM3 (ref 4.6–6)
SODIUM BLD-SCNC: 142 MMOL/L (ref 136–145)
WBC NRBC COR # BLD: 9.59 10*3/MM3 (ref 4.5–10.7)

## 2018-03-21 PROCEDURE — 94799 UNLISTED PULMONARY SVC/PX: CPT

## 2018-03-21 PROCEDURE — 82962 GLUCOSE BLOOD TEST: CPT

## 2018-03-21 PROCEDURE — 25010000002 HYDROCORTISONE SODIUM SUCCINATE 100 MG RECONSTITUTED SOLUTION: Performed by: INTERNAL MEDICINE

## 2018-03-21 PROCEDURE — 63710000001 PREDNISONE PER 1 MG: Performed by: NURSE PRACTITIONER

## 2018-03-21 PROCEDURE — 85027 COMPLETE CBC AUTOMATED: CPT | Performed by: INTERNAL MEDICINE

## 2018-03-21 PROCEDURE — 80053 COMPREHEN METABOLIC PANEL: CPT | Performed by: INTERNAL MEDICINE

## 2018-03-21 PROCEDURE — 25010000002 PIPERACILLIN SOD-TAZOBACTAM PER 1 G: Performed by: INTERNAL MEDICINE

## 2018-03-21 PROCEDURE — 25010000003 CEFAZOLIN IN DEXTROSE 2-4 GM/100ML-% SOLUTION: Performed by: INTERNAL MEDICINE

## 2018-03-21 PROCEDURE — 94762 N-INVAS EAR/PLS OXIMTRY CONT: CPT

## 2018-03-21 PROCEDURE — 99254 IP/OBS CNSLTJ NEW/EST MOD 60: CPT | Performed by: INTERNAL MEDICINE

## 2018-03-21 PROCEDURE — 25010000002 ENOXAPARIN PER 10 MG: Performed by: INTERNAL MEDICINE

## 2018-03-21 PROCEDURE — 87040 BLOOD CULTURE FOR BACTERIA: CPT | Performed by: INTERNAL MEDICINE

## 2018-03-21 RX ORDER — CEFAZOLIN SODIUM 2 G/100ML
2 INJECTION, SOLUTION INTRAVENOUS EVERY 12 HOURS
Status: DISCONTINUED | OUTPATIENT
Start: 2018-03-21 | End: 2018-03-23 | Stop reason: HOSPADM

## 2018-03-21 RX ORDER — PREDNISONE 20 MG/1
20 TABLET ORAL
Status: DISCONTINUED | OUTPATIENT
Start: 2018-03-21 | End: 2018-03-23 | Stop reason: HOSPADM

## 2018-03-21 RX ORDER — AZITHROMYCIN 250 MG/1
500 TABLET, FILM COATED ORAL
Status: DISCONTINUED | OUTPATIENT
Start: 2018-03-21 | End: 2018-03-21

## 2018-03-21 RX ADMIN — PREDNISONE 20 MG: 20 TABLET ORAL at 20:04

## 2018-03-21 RX ADMIN — AZITHROMYCIN 500 MG: 250 TABLET, FILM COATED ORAL at 12:08

## 2018-03-21 RX ADMIN — FAMOTIDINE 20 MG: 20 TABLET, FILM COATED ORAL at 20:04

## 2018-03-21 RX ADMIN — NYSTATIN 500000 UNITS: 100000 SUSPENSION ORAL at 12:08

## 2018-03-21 RX ADMIN — TAZOBACTAM SODIUM AND PIPERACILLIN SODIUM 3.38 G: 375; 3 INJECTION, SOLUTION INTRAVENOUS at 04:13

## 2018-03-21 RX ADMIN — NYSTATIN 500000 UNITS: 100000 SUSPENSION ORAL at 20:04

## 2018-03-21 RX ADMIN — FAMOTIDINE 20 MG: 20 TABLET, FILM COATED ORAL at 08:58

## 2018-03-21 RX ADMIN — ENOXAPARIN SODIUM 40 MG: 40 INJECTION SUBCUTANEOUS at 20:04

## 2018-03-21 RX ADMIN — DOCUSATE SODIUM -SENNOSIDES 2 TABLET: 50; 8.6 TABLET, COATED ORAL at 20:04

## 2018-03-21 RX ADMIN — TAZOBACTAM SODIUM AND PIPERACILLIN SODIUM 3.38 G: 375; 3 INJECTION, SOLUTION INTRAVENOUS at 12:08

## 2018-03-21 RX ADMIN — HYDROCORTISONE SODIUM SUCCINATE 50 MG: 100 INJECTION, POWDER, FOR SOLUTION INTRAMUSCULAR; INTRAVENOUS at 08:58

## 2018-03-21 RX ADMIN — CEFAZOLIN SODIUM 2 G: 2 INJECTION, SOLUTION INTRAVENOUS at 18:45

## 2018-03-21 RX ADMIN — NYSTATIN 500000 UNITS: 100000 SUSPENSION ORAL at 08:58

## 2018-03-21 NOTE — PROGRESS NOTES
PROGRESS NOTE  Patient Name: Gilson Sethi  Age/Sex: 62 y.o. male  : 1955  MRN: [id number]    Date of Admission: 3/18/2018  Date of Encounter Visit: 18   LOS: 3 days   Patient Care Team:  Zach Chi DO as PCP - General (Family Medicine)    Chief Complaint: Doing better, significant improvement compared to initial presentation    Interval History: 62-year-old gentleman with history of sarcoidosis followed by Dr. Den Baum who was being treated with methotrexate and prednisone.  He developed progressive cough and shortness of breath and ended up in the emergency room after he fainted and was found to be hypotensive and septic shock and was started on dopamine and Zosyn and transferred to [facility] for further care. Upon arrival he was started on stress dose steroid.  Zosyn was continued and Zithromax was added. he was also on the vitamin C/thiamine/mineralocorticoid as well. Viral panel, strep pneumonia and legionella were negative. He was found to be in acute renal failure with acute hypoxemia and lactic acidosis with hyponatremia and leukopenia. He was weaned off the dopamine drip and blood pressure has been stable.    He does report that he snores at night, but denies any previous testing for PHUONG.        REVIEW OF SYSTEMS:   CARDIOVASCULAR: No chest pain, chest pressure, positive chest congestion. No palpitations or edema.   RESPIRATORY: Improved shortness of breath with improved cough and less expectoration, but thick and difficult to expectorate.    GASTROINTESTINAL: Improved appetite and tolerating food well. No abdominal pain or blood.   HEMATOLOGIC: No bleeding or bruising.   No fever or chills.   On room air    Vital Signs  Temp:  [97.7 °F (36.5 °C)-98.7 °F (37.1 °C)] 97.9 °F (36.6 °C)  Heart Rate:  [68-83] 83  Resp:  [16-18] 18  BP: (107-133)/(74-86) 125/74  SpO2:  [93 %-97 %] 93 %  on    Device (Oxygen Therapy): room air    Intake/Output Summary (Last 24 hours) at 18  "1929  Last data filed at 03/21/18 1836   Gross per 24 hour   Intake              890 ml   Output              650 ml   Net              240 ml     Flowsheet Rows    Flowsheet Row First Filed Value   Admission Height 172.7 cm (68\") Documented at 03/18/2018 1859   Admission Weight 73.3 kg (161 lb 9.6 oz) Documented at 03/18/2018 1859        Body mass index is 24.81 kg/m².  1    03/19/18  0600 03/20/18  0511 03/21/18  0458   Weight: 73.6 kg (162 lb 4.1 oz) 74 kg (163 lb 2.3 oz) 74 kg (163 lb 3.2 oz)       Physical Exam:  GEN:  Awake and alert, on Room air, in no distress   EYES:   Sclera clear. No icterus. PERRL. Normal EOM  ENT:   External ears/nose normal, no oral lesions, no thrush, mucous membranes moist. Mallampati IV airway with enlarged tongue.   NECK:  Supple, midline trachea, no JVD  LUNGS: Normal chest on inspection, positive crackles LLL and CTA on R, mild expiratory wheeze, diminished bases. Non labored breathing.  CV:  Regular rhythm and rate. Normal S1/S2. No murmurs, gallops, or rubs noted.  ABD:  Soft, non-tender and non-distended. Normal bowel sounds. No guarding  EXT:  Moves all extremities well. No cyanosis. No redness. Trace right ankle edema.   Skin: dry, intact, no bleeding    Results Review:        Results from last 7 days  Lab Units 03/21/18  0326 03/20/18  0342 03/19/18  0613 03/18/18  2225   SODIUM mmol/L 142 139 139 138   POTASSIUM mmol/L 3.9 3.8 4.2 4.3   CHLORIDE mmol/L 109* 106 106 104   CO2 mmol/L 19.3* 20.2* 19.4* 18.8*   BUN mg/dL 42* 31* 30* 31*   CREATININE mg/dL 1.78* 1.51* 1.77* 1.95*   CALCIUM mg/dL 8.3* 7.6* 7.6* 7.1*   AST (SGOT) U/L 13  --  21 17   ALT (SGPT) U/L 15  --  23 21   ANION GAP mmol/L 13.7 12.8 13.6 15.2   ALBUMIN g/dL 1.80*  --  2.70* 2.40*       3/18/18  Creatinine was 2.18 at the outlying facility   lactic acid was 4.3   Troponin was 0.05    White count was 1.3                    Results from last 7 days  Lab Units 03/21/18  0326 03/20/18  0342 03/19/18  0613 " 03/18/18  2226   WBC 10*3/mm3 9.59 5.71 3.44* 0.96*   HEMOGLOBIN g/dL 11.4* 11.1* 12.1* 11.2*   HEMATOCRIT % 35.3* 34.6* 37.8* 35.8*   PLATELETS 10*3/mm3 103* 117* 141 139*   MCV fL 93.6 94.0 93.8 95.0   NEUTROPHIL % %  --   --  89.8*  --    LYMPHOCYTE % %  --   --  1.2*  --    MONOCYTES % %  --   --  8.7  --    EOSINOPHIL % %  --   --  0.0*  --    BASOPHIL % %  --   --  0.0  --    IMM GRAN % %  --   --  0.0  --            Results from last 7 days  Lab Units 03/20/18  0342   MAGNESIUM mg/dL 2.1           Invalid input(s): LDLCALC          Glucose   Date/Time Value Ref Range Status   03/21/2018 1829 146 (H) 70 - 130 mg/dL Final   03/21/2018 1208 128 70 - 130 mg/dL Final   03/21/2018 0612 109 70 - 130 mg/dL Final   03/20/2018 2343 130 70 - 130 mg/dL Final   03/20/2018 1620 168 (H) 70 - 130 mg/dL Final   03/20/2018 1114 123 70 - 130 mg/dL Final   03/19/2018 2326 110 70 - 130 mg/dL Final   03/19/2018 1510 202 (H) 70 - 130 mg/dL Final       Results from last 7 days  Lab Units 03/19/18  0613 03/19/18  0255 03/18/18  2226   LACTATE mmol/L 1.4 1.7 2.9*       Results from last 7 days  Lab Units 03/19/18  0305 03/19/18  0301   RESPCX  Rejected  --    GRAM STAIN RESULT  Many (4+) Epithelial cells seen  No WBCs seen  Many (4+) Mixed bacterial morphotypes seen on Gram Stain  --    STREP PNEUMO AG   --  Negative   L. PNEUMOPHILA SEROGP 1 UR AG   --  Negative           Results from last 7 days  Lab Units 03/19/18  0256   ADENOVIRUS DETECTION BY PCR  Not Detected   CORONAVIRUS 229E  Not Detected   CORONAVIRUS HKU1  Not Detected   CORONAVIRUS NL63  Not Detected   CORONAVIRUS OC43  Not Detected   HUMAN METAPNEUMOVIRUS  Not Detected   HUMAN RHINOVIRUS/ENTEROVIRUS  Not Detected   INFLUENZA B PCR  Not Detected   PARAINFLUENZA 1  Not Detected   PARAINFLUENZA VIRUS 2  Not Detected   PARAINFLUENZA VIRUS 3  Not Detected   PARAINFLUENZA VIRUS 4  Not Detected   BORDETELLA PERTUSSIS PCR  Not Detected   CHLAMYDOPHILA PNEUMONIAE PCR  Not  Detected   MYCOPLAMA PNEUMO PCR  Not Detected   INFLUENZA A PCR  Not Detected   INFLUENZA A H3  Not Detected   INFLUENZA A H1  Not Detected   RSV, PCR  Not Detected           Imaging:   Imaging Results (all)     None          I reviewed the patient's new clinical results.  I personally viewed and interpreted the patient's imaging results:Central line tip in the superior vena cava, patient have a dense left lung pneumonia which is slightly worse compared to his x-ray on initial presentation to the emergency room and this is significant change compared to his baseline films from earlier.        Medication Review:     ceFAZolin 2 g Intravenous Q12H   enoxaparin 40 mg Subcutaneous Q24H   famotidine 20 mg Oral BID   insulin aspart 0-14 Units Subcutaneous Q6H   nystatin 5 mL Oral 4x Daily   predniSONE 20 mg Oral TID With Meals   sennosides-docusate sodium 2 tablet Oral Nightly         Pharmacy to Dose Zosyn        ASSESSMENT:   1. Sepsis with septic shock  2. 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 Pneumonia with acute hypoxemic respiratory failure  4. History of sarcoidosis on prednisone and methotrexate at home  5. Acute kidney injury, improved initially  but Creatinine trended slightly up today  6. Leukopenia due to sepsis and methotrexate, Resolved   7. Mild steroid-induced hyperglycemia, already have orders for sliding scale insulin  8. Immunocompromised host on prednisone for almost 2 months and on methotrexate  9. Oral thrush- improved  10. Snoring  11. Daytime sleepiness   12. Protein malnutrition    PLAN:  Patient has been off pressors for over 24 hours with stable BP and will discontinue central ling.   Continue steroids and transition to oral prednisone 20 mg TID and wean as improves  Hold home methotrexate.   Overall clinically improving and will transition to oral antibiotics when ok with ID as he is improving, but did have a positive blood culture at [facility] for staph aureus, but only one of the  cultures showed growth.   Tolerating by mouth diet on sliding scale insulin  As discussed earlier PCP pneumonia is unlikely given the presentation and the chest x-ray finding and he is already doing better on the current antibiotic regimen.  Patient has symptoms of PHUONG with anatomical findings to suggest possible PHUONG. Will check overnight oximetry tonight and if abnormal will send home on oxygen.   Follow closely on renal function especially that he will be started on VANC, check urine eosinophils and consult renal if no better by tomorrow  Discussed with the patient and with the family at the bedside    Disposition: likely home tomorrow or the next day if continues to improve.   Will need oxygen evaluation at time of discharge and a nebulizer machine for at home.       Teresa Davenport MD  03/21/18  7:29 PM    Dictated utilizing Dragon dictation:  Much of this encounter note is an electronic transcription/translation of spoken language to printed text. The electronic translation of spoken language may permit erroneous, or at times, nonsensical words or phrases to be inadvertently transcribed; Although I have reviewed the note for such errors, some may still exist.

## 2018-03-21 NOTE — PLAN OF CARE
Problem: Pain, Acute (Adult)  Goal: Acceptable Pain Control/Comfort Level  Outcome: Ongoing (interventions implemented as appropriate)   03/20/18 2022   Pain, Acute (Adult)   Acceptable Pain Control/Comfort Level making progress toward outcome

## 2018-03-21 NOTE — PROGRESS NOTES
Continued Stay Note  [facility]     Patient Name: Gilson Sethi  MRN: [id number]  Today's Date: 3/21/2018    Admit Date: 3/18/2018          Discharge Plan     Row Name 03/21/18 1631       Plan    Plan Comments [facility] does not take insurance  Referral to option cindy Gonzales following    Row Name 03/21/18 1347       Plan    Plan Home    Plan Comments Spoke to patient and he is aware that he needs home infusion for antibiotics and nursing  Shelia with [facility] notified aand will follow              Discharge Codes    No documentation.           Lala Leiva RN

## 2018-03-21 NOTE — PLAN OF CARE
Problem: Pneumonia (Adult)  Goal: Signs and Symptoms of Listed Potential Problems Will be Absent, Minimized or Managed (Pneumonia)  Outcome: Ongoing (interventions implemented as appropriate)   03/20/18 2023   Goal/Outcome Evaluation   Problems Assessed (Pneumonia) all   Problems Present (Pneumonia) none

## 2018-03-21 NOTE — PROGRESS NOTES
Discharge Planning Assessment  [facility]     Patient Name: Gilson Sethi  MRN: [id number]  Today's Date: 3/21/2018    Admit Date: 3/18/2018          Discharge Needs Assessment     Row Name 03/21/18 5811       Living Environment    Lives With spouse;child(mary ellen), adult;child(mary ellen), dependent    Current Living Arrangements home/apartment/condo    Primary Care Provided by self;spouse/significant other    Provides Primary Care For no one    Family Caregiver if Needed spouse    Quality of Family Relationships supportive;involved;helpful    Able to Return to Prior Arrangements yes       Resource/Environmental Concerns    Resource/Environmental Concerns none    Transportation Concerns car, none       Transition Planning    Patient/Family Anticipates Transition to home with family    Patient/Family Anticipated Services at Transition other (see comments);home health care;    Transportation Anticipated family or friend will provide       Discharge Needs Assessment    Readmission Within the Last 30 Days no previous admission in last 30 days    Concerns to be Addressed basic needs    Equipment Currently Used at Home none    Anticipated Changes Related to Illness none    Equipment Needed After Discharge none    Outpatient/Agency/Support Group Needs homecare agency    Discharge Facility/Level of Care Needs home with home health;other (see comments)            Discharge Plan     Row Name 03/21/18 1297       Plan    Plan Home    Plan Comments Spoke to patient and he is aware that he needs home infusion for antibiotics and nursing  Shelia with [facility] notified aand will follow        Destination     No service coordination in this encounter.      Durable Medical Equipment     No service coordination in this encounter.      Dialysis/Infusion     No service coordination in this encounter.      Home Medical Care     Service Request Status Selected Specialties Address Phone Number Fax Number    [facility]  [place] Pending - Request Sent N/A [address] [place] [id number] [phone]        Llaa Leiva RN 3/21/2018 1344    Spoke to bridget  Pt needs infusions from [facility]                 Social Care     No service coordination in this encounter.                Demographic Summary    No documentation.           Functional Status     Row Name 03/21/18 1344       Functional Status    Usual Activity Tolerance good    Current Activity Tolerance fair       Functional Status, IADL    Medications independent    Meal Preparation independent    Housekeeping independent    Laundry independent    Shopping independent       Mental Status    General Appearance WDL WDL       Mental Status Summary    Recent Changes in Mental Status/Cognitive Functioning no changes       Employment/    Employment Status employed full time            Psychosocial    No documentation.           Abuse/Neglect    No documentation.           Legal    No documentation.           Substance Abuse    No documentation.           Patient Forms    No documentation.         Lala Leiva RN

## 2018-03-21 NOTE — PLAN OF CARE
Problem: Fluid Volume Excess (Adult)  Goal: Optimal Fluid Balance  Outcome: Ongoing (interventions implemented as appropriate)   03/20/18 2023   Fluid Volume Excess (Adult)   Optimal Fluid Balance making progress toward outcome

## 2018-03-21 NOTE — PLAN OF CARE
Problem: Patient Care Overview  Goal: Plan of Care Review  Outcome: Ongoing (interventions implemented as appropriate)   03/21/18 2470   Coping/Psychosocial   Plan of Care Reviewed With patient   Plan of Care Review   Progress improving   OTHER   Outcome Summary Pt continues on IV Abx. VSS. No SSI given this shift. Continue IV steroids. Pt rested well. Ambulated in room. Safety maintained. Continue to monitor.       Problem: Skin Injury Risk (Adult)  Goal: Skin Health and Integrity  Outcome: Ongoing (interventions implemented as appropriate)

## 2018-03-21 NOTE — PLAN OF CARE
Problem: Fall Risk (Adult)  Goal: Absence of Fall  Outcome: Ongoing (interventions implemented as appropriate)   03/20/18 2023   Fall Risk (Adult)   Absence of Fall making progress toward outcome

## 2018-03-21 NOTE — DISCHARGE PLACEMENT REQUEST
"Gilson Champagne (62 y.o. Male)     Date of Birth Social Security Number Address Home Phone MRN    1955  [address] [id number] [phone] [id number]    Oriental orthodox Marital Status          None        Admission Date Admission Type Admitting Provider Attending Provider Department, Room/Bed    3/18/18 Urgent Teresa Davenport MD Saad, Lebnan S, MD [address], 430/1    Discharge Date Discharge Disposition Discharge Destination                       Attending Provider:  Teresa Davenport MD    Allergies:  No Known Allergies    Isolation:  None   Infection:  None   Code Status:  FULL    Ht:  172.7 cm (68\")   Wt:  74 kg (163 lb 3.2 oz)    Admission Cmt:  [employer]   Principal Problem:  None                Active Insurance as of 3/18/2018     Primary Coverage     Payor Plan Insurance Group Employer/Plan Group    MISC COMMERCIAL MISC COMMERCIAL INS      Coverage Address Coverage Phone Number Effective From Effective To    [id number] [address] [phone] 5/1/2014     [place] [address]       Subscriber Name Subscriber Birth Date Member ID       GILSON CHAMPAGNE [id number]                 Emergency Contacts      (Rel.) Home Phone Work Phone Mobile Phone    Marcie Champagne (Spouse) [phone] -- [phone]    Alex Champagne (Brother) -- -- [phone]              "

## 2018-03-21 NOTE — PLAN OF CARE
Problem: Sepsis/Septic Shock (Adult)  Goal: Signs and Symptoms of Listed Potential Problems Will be Absent, Minimized or Managed (Sepsis/Septic Shock)  Outcome: Ongoing (interventions implemented as appropriate)      Problem: Fall Risk (Adult)  Goal: Identify Related Risk Factors and Signs and Symptoms  Outcome: Ongoing (interventions implemented as appropriate)      Problem: Pneumonia (Adult)  Goal: Signs and Symptoms of Listed Potential Problems Will be Absent, Minimized or Managed (Pneumonia)  Outcome: Ongoing (interventions implemented as appropriate)      Problem: Patient Care Overview  Goal: Plan of Care Review  Outcome: Ongoing (interventions implemented as appropriate)   03/21/18 1955   Coping/Psychosocial   Plan of Care Reviewed With patient   Plan of Care Review   Progress improving   OTHER   Outcome Summary Pt blood cultures from [facility] reported back positive; ID consulted and blood cultures redrawn from central line; ABX changed; pt doing well; VSS, congested/productive cough; up ad marvin; central line removed

## 2018-03-21 NOTE — CONSULTS
Referring Provider: Teresa Davenport MD  [id number]  [address] [address]  Reason for Consultation: Staph aureus bacteremia    Subjective   History of present illness:  This is a 62-year-old male with hypertension and sarcoidosis who was admitted on March 18 and septic shock.  The patient was recently diagnosed with sarcoidosis in January 2018.  He was started on methotrexate and prednisone 40mg PO qday.  He states he has had a chronic cough occasionally productive of sputum since that time that remains unchanged.  2 days prior to admission the patient started feeling poorly with malaise.  He also had a subjective fever with chills.  That night he developed watery diarrhea greater than 5 bowel movements overnight.  The next morning he once again went to the bathroom where apparently he lost consciousness and hit his head.  With the diarrhea the patient denies any abdominal pain nausea or vomiting.  He denied any recent procedures except as lung biopsy in January.  He denied any recent antibiotic use.  He denied any skin infections.  He was brought to the emergency room at [facility].  There he was found to be hypertensive and was transferred to [facility].  Admission imaging was concerning for possible basilar and left upper lobe pneumonia and he was empirically started on Zosyn and azithromycin.  Blood work revealed leukopenia as well as acute kidney injury.  He was started on stress dose steroids.  The patient did well on in the hospital and came off pressors on March 20.  His acute hypoxic respiratory failure resolved (he was on 3L NC on admission).  His admission blood cultures from the [facility] are growing staph aureus which per preliminary result is MSSA.  Currently the patient reports he feels a lot better.  He denies any abdominal pain nausea or vomiting.  Had 2 loose bowel movements and lost 24 hours.  His breathing is at baseline.  He is tolerating antibiotics  without a rash.  He denies any dysuria or increasing urinary frequency.  He denies any sore throat or rhinorrhea.    Past Medical History:   Diagnosis Date   • Hypertension    • Pulmonary fibrosis    COPD  Mixed connective tissue disease - lung pathology concerning for sarcoidosis    Past Surgical History:   Procedure Laterality Date   • CATARACT EXTRACTION Right    • CYST REMOVAL      neck             reports that he has quit smoking. He does not have any smokeless tobacco history on file. He reports that he drinks alcohol. His drug history is not on file.  Works as a      family history includes Prostate cancer in his brother and father.    No Known Allergies    Medication:  Antibiotics:  Azithromycin 500 mg by mouth every 24 hours  Zosyn 3.375 g IV every 8 hours    Please refer to the medical record for a full medication list    Review of Systems  Pertinent items are noted in HPI, all other systems reviewed and negative    Objective   Vital Signs   Temp:  [97.7 °F (36.5 °C)-98.7 °F (37.1 °C)] 97.7 °F (36.5 °C)  Heart Rate:  [68-93] 68  Resp:  [16-18] 18  BP: (102-133)/(66-86) 133/86  97% on RA    Physical Exam:   General: In no acute distress  HEENT: Normocephalic, atraumatic, PERRL, EOMI, no scleral icterus. Oropharynx is clear and moist  Neck: Supple, trachea is midline  Cardiovascular: Normal rate, regular rhythm, john S1 and S2, no murmurs, rubs, or gallops    Respiratory: Crackles at the bases and in the left upper lobe, no wheezing   GI: Abdomen is soft, non-tender, non-distended, positive bowel sounds bilaterally, no masses  Musculoskeletal: Normal range of motion, no edema, tenderness or deformity  Skin: No rashes or lesions  Extremities: no E/C/C  Neurological: Alert and oriented, cranial nerves 2-12 intact, motor strength 5/5 in all four extremities  Psychiatric: Normal mood and affect   Access: Right IJ without erythema or purulence    Results Review:   I reviewed the patient's new clinical  results.  I reviewed the patient's new imaging results and agree with the interpretation.    Lab Results   Component Value Date    WBC 9.59 03/21/2018    HGB 11.4 (L) 03/21/2018    HCT 35.3 (L) 03/21/2018    MCV 93.6 03/21/2018     (L) 03/21/2018       Lab Results   Component Value Date    GLUCOSE 134 (H) 03/21/2018    BUN 42 (H) 03/21/2018    CREATININE 1.78 (H) 03/21/2018    EGFRIFAFRI 47 (L) 03/21/2018    BCR 23.6 03/21/2018    CO2 19.3 (L) 03/21/2018    CALCIUM 8.3 (L) 03/21/2018    ALBUMIN 1.80 (L) 03/21/2018    LABIL2 0.5 03/21/2018    AST 13 03/21/2018    ALT 15 03/21/2018       Microbiology:  3/19 SCx neg  3/19 Strep pneumo ag neg  3/19 Legionella urein ag neg  3/19 RVP neg    OSH -   3/18 BCx MSSA (prelim)    Radiology:  Admission chest x-ray personally reviewed by me shows chronic emphysema.  By basilar and left apical opacity.  Left-sided pleural effusion.    Assessment/Plan   1.  MSSA bacteremia and septic shock. Shock now resolved - new to provider   - Repeat blood cultures ×2 today  - Discontinue Zosyn and azithromycin and start cefazolin 2 g IV every 12 hours  - Obtain a surface echocardiogram to evaluate for possible endocarditis    2.  Acute hypoxic respiratory failure - resolved    3.  Sarcoidosis on methotrexate and prednisone- complicating above  - Management per pulmonary medicine  - He is on high dose steroids as an outpatient and if he continues on such he needs to be on PCP prophylaxis    4.  Leukopenia present on admission now resolved most likely secondary to methotrexate    5.  Acute kidney injury - complicating above  - Antibiotics dosed appropriately    6. Thrombocytopenia    I discussed the patients findings and my recommendations with patient and nursing staff     I have reviewed the patient's medical record and summarized the findings above

## 2018-03-21 NOTE — PROGRESS NOTES
Continued Stay Note  [facility]     Patient Name: Gilson Sethi  MRN: [id number]  Today's Date: 3/21/2018    Admit Date: 3/18/2018          Discharge Plan     Row Name 03/21/18 1347       Plan    Plan Home    Plan Comments Spoke to patient and he is aware that he needs home infusion for antibiotics and nursing  Shelia with [facility] notified aand will follow              Discharge Codes    No documentation.           Lala Leiva RN

## 2018-03-21 NOTE — PLAN OF CARE
Problem: Sepsis/Septic Shock (Adult)  Goal: Signs and Symptoms of Listed Potential Problems Will be Absent, Minimized or Managed (Sepsis/Septic Shock)  Outcome: Ongoing (interventions implemented as appropriate)   03/20/18 2022   Goal/Outcome Evaluation   Problems Assessed (Sepsis) all   Problems Present (Sepsis) glycemic control impaired;situational response

## 2018-03-22 ENCOUNTER — APPOINTMENT (OUTPATIENT)
Dept: CARDIOLOGY | Facility: HOSPITAL | Age: 63
End: 2018-03-22
Attending: INTERNAL MEDICINE

## 2018-03-22 LAB
ANION GAP SERPL CALCULATED.3IONS-SCNC: 13.2 MMOL/L
ASCENDING AORTA: 3.3 CM
BH CV ECHO MEAS - ACS: 1.6 CM
BH CV ECHO MEAS - AO MAX PG (FULL): 1.2 MMHG
BH CV ECHO MEAS - AO MAX PG: 3.2 MMHG
BH CV ECHO MEAS - AO MEAN PG (FULL): 1 MMHG
BH CV ECHO MEAS - AO MEAN PG: 2 MMHG
BH CV ECHO MEAS - AO ROOT AREA (BSA CORRECTED): 1.8
BH CV ECHO MEAS - AO ROOT AREA: 8.6 CM^2
BH CV ECHO MEAS - AO ROOT DIAM: 3.3 CM
BH CV ECHO MEAS - AO V2 MAX: 88.8 CM/SEC
BH CV ECHO MEAS - AO V2 MEAN: 59.7 CM/SEC
BH CV ECHO MEAS - AO V2 VTI: 19.7 CM
BH CV ECHO MEAS - ASC AORTA: 3.2 CM
BH CV ECHO MEAS - AVA(I,A): 2.4 CM^2
BH CV ECHO MEAS - AVA(I,D): 2.4 CM^2
BH CV ECHO MEAS - AVA(V,A): 2.5 CM^2
BH CV ECHO MEAS - AVA(V,D): 2.5 CM^2
BH CV ECHO MEAS - BSA(HAYCOCK): 1.9 M^2
BH CV ECHO MEAS - BSA: 1.9 M^2
BH CV ECHO MEAS - BZI_BMI: 25.1 KILOGRAMS/M^2
BH CV ECHO MEAS - BZI_METRIC_HEIGHT: 172.7 CM
BH CV ECHO MEAS - BZI_METRIC_WEIGHT: 74.8 KG
BH CV ECHO MEAS - CONTRAST EF (2CH): 51.8 ML/M^2
BH CV ECHO MEAS - CONTRAST EF 4CH: 30.2 ML/M^2
BH CV ECHO MEAS - EDV(CUBED): 125 ML
BH CV ECHO MEAS - EDV(MOD-SP2): 166 ML
BH CV ECHO MEAS - EDV(MOD-SP4): 129 ML
BH CV ECHO MEAS - EDV(TEICH): 118.2 ML
BH CV ECHO MEAS - EF(CUBED): 68.6 %
BH CV ECHO MEAS - EF(MOD-SP2): 51.8 %
BH CV ECHO MEAS - EF(MOD-SP4): 30.2 %
BH CV ECHO MEAS - EF(TEICH): 59.9 %
BH CV ECHO MEAS - ESV(CUBED): 39.3 ML
BH CV ECHO MEAS - ESV(MOD-SP2): 80 ML
BH CV ECHO MEAS - ESV(MOD-SP4): 90 ML
BH CV ECHO MEAS - ESV(TEICH): 47.4 ML
BH CV ECHO MEAS - FS: 32 %
BH CV ECHO MEAS - IVS/LVPW: 0.94
BH CV ECHO MEAS - IVSD: 0.8 CM
BH CV ECHO MEAS - LAT PEAK E' VEL: 6 CM/SEC
BH CV ECHO MEAS - LV DIASTOLIC VOL/BSA (35-75): 68.5 ML/M^2
BH CV ECHO MEAS - LV MASS(C)D: 141.3 GRAMS
BH CV ECHO MEAS - LV MASS(C)DI: 75 GRAMS/M^2
BH CV ECHO MEAS - LV MAX PG: 2 MMHG
BH CV ECHO MEAS - LV MEAN PG: 1 MMHG
BH CV ECHO MEAS - LV SYSTOLIC VOL/BSA (12-30): 47.8 ML/M^2
BH CV ECHO MEAS - LV V1 MAX: 70.2 CM/SEC
BH CV ECHO MEAS - LV V1 MEAN: 42.8 CM/SEC
BH CV ECHO MEAS - LV V1 VTI: 14.8 CM
BH CV ECHO MEAS - LVIDD: 5 CM
BH CV ECHO MEAS - LVIDS: 3.4 CM
BH CV ECHO MEAS - LVLD AP2: 8.5 CM
BH CV ECHO MEAS - LVLD AP4: 8 CM
BH CV ECHO MEAS - LVLS AP2: 7.2 CM
BH CV ECHO MEAS - LVLS AP4: 7.2 CM
BH CV ECHO MEAS - LVOT AREA (M): 3.1 CM^2
BH CV ECHO MEAS - LVOT AREA: 3.1 CM^2
BH CV ECHO MEAS - LVOT DIAM: 2 CM
BH CV ECHO MEAS - LVPWD: 0.85 CM
BH CV ECHO MEAS - MED PEAK E' VEL: 6 CM/SEC
BH CV ECHO MEAS - MR MAX PG: 110.7 MMHG
BH CV ECHO MEAS - MR MAX VEL: 526 CM/SEC
BH CV ECHO MEAS - MV A DUR: 0.14 SEC
BH CV ECHO MEAS - MV A MAX VEL: 93.1 CM/SEC
BH CV ECHO MEAS - MV DEC SLOPE: 533 CM/SEC^2
BH CV ECHO MEAS - MV DEC TIME: 0.2 SEC
BH CV ECHO MEAS - MV E MAX VEL: 75.2 CM/SEC
BH CV ECHO MEAS - MV E/A: 0.81
BH CV ECHO MEAS - MV MAX PG: 3.3 MMHG
BH CV ECHO MEAS - MV MEAN PG: 1 MMHG
BH CV ECHO MEAS - MV P1/2T MAX VEL: 93.7 CM/SEC
BH CV ECHO MEAS - MV P1/2T: 51.5 MSEC
BH CV ECHO MEAS - MV V2 MAX: 90.4 CM/SEC
BH CV ECHO MEAS - MV V2 MEAN: 49.8 CM/SEC
BH CV ECHO MEAS - MV V2 VTI: 23.1 CM
BH CV ECHO MEAS - MVA P1/2T LCG: 2.3 CM^2
BH CV ECHO MEAS - MVA(P1/2T): 4.3 CM^2
BH CV ECHO MEAS - MVA(VTI): 2 CM^2
BH CV ECHO MEAS - PA ACC TIME: 0.1 SEC
BH CV ECHO MEAS - PA MAX PG (FULL): 1.3 MMHG
BH CV ECHO MEAS - PA MAX PG: 2.6 MMHG
BH CV ECHO MEAS - PA PR(ACCEL): 34.5 MMHG
BH CV ECHO MEAS - PA V2 MAX: 80.1 CM/SEC
BH CV ECHO MEAS - PI END-D VEL: 121 CM/SEC
BH CV ECHO MEAS - PULM A REVS DUR: 0.11 SEC
BH CV ECHO MEAS - PULM A REVS VEL: 27.2 CM/SEC
BH CV ECHO MEAS - PULM DIAS VEL: 41.2 CM/SEC
BH CV ECHO MEAS - PULM S/D: 1.3
BH CV ECHO MEAS - PULM SYS VEL: 54.8 CM/SEC
BH CV ECHO MEAS - PVA(V,A): 2.2 CM^2
BH CV ECHO MEAS - PVA(V,D): 2.2 CM^2
BH CV ECHO MEAS - QP/QS: 1.1
BH CV ECHO MEAS - RAP SYSTOLE: 3 MMHG
BH CV ECHO MEAS - RV MAX PG: 1.2 MMHG
BH CV ECHO MEAS - RV MEAN PG: 1 MMHG
BH CV ECHO MEAS - RV V1 MAX: 55.4 CM/SEC
BH CV ECHO MEAS - RV V1 MEAN: 37.6 CM/SEC
BH CV ECHO MEAS - RV V1 VTI: 15.7 CM
BH CV ECHO MEAS - RVOT AREA: 3.1 CM^2
BH CV ECHO MEAS - RVOT DIAM: 2 CM
BH CV ECHO MEAS - SI(AO): 89.5 ML/M^2
BH CV ECHO MEAS - SI(CUBED): 45.5 ML/M^2
BH CV ECHO MEAS - SI(LVOT): 24.7 ML/M^2
BH CV ECHO MEAS - SI(MOD-SP2): 45.7 ML/M^2
BH CV ECHO MEAS - SI(MOD-SP4): 20.7 ML/M^2
BH CV ECHO MEAS - SI(TEICH): 37.6 ML/M^2
BH CV ECHO MEAS - SUP REN AO DIAM: 1.95 CM
BH CV ECHO MEAS - SV(AO): 168.5 ML
BH CV ECHO MEAS - SV(CUBED): 85.7 ML
BH CV ECHO MEAS - SV(LVOT): 46.5 ML
BH CV ECHO MEAS - SV(MOD-SP2): 86 ML
BH CV ECHO MEAS - SV(MOD-SP4): 39 ML
BH CV ECHO MEAS - SV(RVOT): 49.3 ML
BH CV ECHO MEAS - SV(TEICH): 70.8 ML
BH CV ECHO MEAS - TAPSE (>1.6): 1.9 CM2
BH CV VAS BP RIGHT ARM: NORMAL MMHG
BH CV XLRA - RV BASE: 3.5 CM
BH CV XLRA - TDI S': 8 CM/SEC
BUN BLD-MCNC: 46 MG/DL (ref 8–23)
BUN/CREAT SERPL: 24.7 (ref 7–25)
CALCIUM SPEC-SCNC: 8.7 MG/DL (ref 8.6–10.5)
CHLORIDE SERPL-SCNC: 103 MMOL/L (ref 98–107)
CO2 SERPL-SCNC: 22.8 MMOL/L (ref 22–29)
CREAT BLD-MCNC: 1.86 MG/DL (ref 0.76–1.27)
E/E' RATIO: 13
EOSINOPHIL SPEC QL MICRO: 0 % EOS/100 CELLS (ref 0–0)
GFR SERPL CREATININE-BSD FRML MDRD: 45 ML/MIN/1.73
GLUCOSE BLD-MCNC: 155 MG/DL (ref 65–99)
GLUCOSE BLDC GLUCOMTR-MCNC: 126 MG/DL (ref 70–130)
GLUCOSE BLDC GLUCOMTR-MCNC: 146 MG/DL (ref 70–130)
GLUCOSE BLDC GLUCOMTR-MCNC: 180 MG/DL (ref 70–130)
GLUCOSE BLDC GLUCOMTR-MCNC: 222 MG/DL (ref 70–130)
MAXIMAL PREDICTED HEART RATE: 159 BPM
POTASSIUM BLD-SCNC: 3.6 MMOL/L (ref 3.5–5.2)
SODIUM BLD-SCNC: 139 MMOL/L (ref 136–145)
STRESS TARGET HR: 135 BPM

## 2018-03-22 PROCEDURE — 82962 GLUCOSE BLOOD TEST: CPT

## 2018-03-22 PROCEDURE — 93306 TTE W/DOPPLER COMPLETE: CPT | Performed by: INTERNAL MEDICINE

## 2018-03-22 PROCEDURE — 94799 UNLISTED PULMONARY SVC/PX: CPT

## 2018-03-22 PROCEDURE — 25010000003 CEFAZOLIN IN DEXTROSE 2-4 GM/100ML-% SOLUTION: Performed by: INTERNAL MEDICINE

## 2018-03-22 PROCEDURE — 63710000001 INSULIN ASPART PER 5 UNITS: Performed by: INTERNAL MEDICINE

## 2018-03-22 PROCEDURE — 87205 SMEAR GRAM STAIN: CPT | Performed by: INTERNAL MEDICINE

## 2018-03-22 PROCEDURE — 63710000001 PREDNISONE PER 1 MG: Performed by: NURSE PRACTITIONER

## 2018-03-22 PROCEDURE — 93306 TTE W/DOPPLER COMPLETE: CPT

## 2018-03-22 PROCEDURE — 25010000002 ENOXAPARIN PER 10 MG: Performed by: INTERNAL MEDICINE

## 2018-03-22 PROCEDURE — 99233 SBSQ HOSP IP/OBS HIGH 50: CPT | Performed by: INTERNAL MEDICINE

## 2018-03-22 PROCEDURE — 25010000002 PERFLUTREN (DEFINITY) 8.476 MG IN SODIUM CHLORIDE 0.9 % 10 ML INJECTION: Performed by: INTERNAL MEDICINE

## 2018-03-22 PROCEDURE — 80048 BASIC METABOLIC PNL TOTAL CA: CPT | Performed by: INTERNAL MEDICINE

## 2018-03-22 RX ADMIN — PREDNISONE 20 MG: 20 TABLET ORAL at 18:39

## 2018-03-22 RX ADMIN — NYSTATIN 500000 UNITS: 100000 SUSPENSION ORAL at 21:17

## 2018-03-22 RX ADMIN — DOCUSATE SODIUM -SENNOSIDES 2 TABLET: 50; 8.6 TABLET, COATED ORAL at 21:17

## 2018-03-22 RX ADMIN — NYSTATIN 500000 UNITS: 100000 SUSPENSION ORAL at 12:45

## 2018-03-22 RX ADMIN — NYSTATIN 500000 UNITS: 100000 SUSPENSION ORAL at 18:39

## 2018-03-22 RX ADMIN — INSULIN ASPART 3 UNITS: 100 INJECTION, SOLUTION INTRAVENOUS; SUBCUTANEOUS at 12:45

## 2018-03-22 RX ADMIN — ENOXAPARIN SODIUM 40 MG: 40 INJECTION SUBCUTANEOUS at 21:17

## 2018-03-22 RX ADMIN — PREDNISONE 20 MG: 20 TABLET ORAL at 12:45

## 2018-03-22 RX ADMIN — PREDNISONE 20 MG: 20 TABLET ORAL at 08:55

## 2018-03-22 RX ADMIN — PERFLUTREN 3 ML: 6.52 INJECTION, SUSPENSION INTRAVENOUS at 09:00

## 2018-03-22 RX ADMIN — FAMOTIDINE 20 MG: 20 TABLET, FILM COATED ORAL at 08:55

## 2018-03-22 RX ADMIN — NYSTATIN 500000 UNITS: 100000 SUSPENSION ORAL at 08:55

## 2018-03-22 RX ADMIN — CEFAZOLIN SODIUM 2 G: 2 INJECTION, SOLUTION INTRAVENOUS at 04:21

## 2018-03-22 RX ADMIN — FAMOTIDINE 20 MG: 20 TABLET, FILM COATED ORAL at 21:17

## 2018-03-22 RX ADMIN — INSULIN ASPART 5 UNITS: 100 INJECTION, SOLUTION INTRAVENOUS; SUBCUTANEOUS at 21:17

## 2018-03-22 RX ADMIN — CEFAZOLIN SODIUM 2 G: 2 INJECTION, SOLUTION INTRAVENOUS at 16:23

## 2018-03-22 NOTE — PLAN OF CARE
Problem: Sepsis/Septic Shock (Adult)  Goal: Signs and Symptoms of Listed Potential Problems Will be Absent, Minimized or Managed (Sepsis/Septic Shock)  Outcome: Ongoing (interventions implemented as appropriate)      Problem: Fall Risk (Adult)  Goal: Identify Related Risk Factors and Signs and Symptoms  Outcome: Outcome(s) achieved Date Met: 03/22/18    Goal: Absence of Fall  Outcome: Ongoing (interventions implemented as appropriate)      Problem: Pneumonia (Adult)  Goal: Signs and Symptoms of Listed Potential Problems Will be Absent, Minimized or Managed (Pneumonia)  Outcome: Ongoing (interventions implemented as appropriate)      Problem: Patient Care Overview  Goal: Plan of Care Review  Outcome: Ongoing (interventions implemented as appropriate)   03/22/18 0524   Coping/Psychosocial   Plan of Care Reviewed With patient   Plan of Care Review   Progress improving   OTHER   Outcome Summary NO ACUTE DISTRESS NOTED THIS SHIFT, ATBX CONTINUES, VSS AND WNL, CONTINUE PLAN OF CARE     Goal: Individualization and Mutuality  Outcome: Ongoing (interventions implemented as appropriate)    Goal: Discharge Needs Assessment  Outcome: Ongoing (interventions implemented as appropriate)

## 2018-03-22 NOTE — PROGRESS NOTES
PROGRESS NOTE  Patient Name: Gilson Sethi  Age/Sex: 62 y.o. male  : 1955  MRN: [id number]    Date of Admission: 3/18/2018  Date of Encounter Visit: 18   LOS: 4 days   Patient Care Team:  Zach Chi DO as PCP - General (Family Medicine)    Chief Complaint: Doing better, significant improvement compared to initial presentation    Interval History: 62-year-old gentleman with history of sarcoidosis followed by Dr. Den Baum who was being treated with methotrexate and prednisone.  He developed progressive cough and shortness of breath and ended up in the emergency room after he fainted and was found to be hypotensive and septic shock and was started on dopamine and Zosyn and transferred to [facility] for further care. Upon arrival he was started on stress dose steroid.  Zosyn was continued and Zithromax was added. he was also on the vitamin C/thiamine/mineralocorticoid as well. Viral panel, strep pneumonia and legionella were negative. He was found to be in acute renal failure with acute hypoxemia and lactic acidosis with hyponatremia and leukopenia. He was weaned off the dopamine drip and blood pressure has been stable.     He does report that he snores at night, but denies any previous testing for PHUONG.      REVIEW OF SYSTEMS:   CARDIOVASCULAR: No chest pain, chest pressure, positive chest congestion. No palpitations or edema.   RESPIRATORY: Improved shortness of breath especially with exertion. improved cough and less expectoration, but still has thick yellow tan secretions.   GASTROINTESTINAL: Improved appetite. No abdominal pain or blood.   HEMATOLOGIC: No bleeding or bruising.       Vital Signs  Temp:  [97.6 °F (36.4 °C)-98.3 °F (36.8 °C)] 98.3 °F (36.8 °C)  Heart Rate:  [] 74  Resp:  [16-18] 16  BP: (109-142)/(71-95) 117/80  SpO2:  [93 %-94 %] 94 %  on    Device (Oxygen Therapy): room air    Intake/Output Summary (Last 24 hours) at 18 5844  Last data filed at 18  "1507   Gross per 24 hour   Intake              480 ml   Output             1075 ml   Net             -595 ml     Flowsheet Rows    Flowsheet Row First Filed Value   Admission Height 172.7 cm (68\") Documented at 03/18/2018 1859   Admission Weight 73.3 kg (161 lb 9.6 oz) Documented at 03/18/2018 1859        Body mass index is 25.11 kg/m².  1    03/20/18  0511 03/21/18  0458 03/22/18  0558   Weight: 74 kg (163 lb 2.3 oz) 74 kg (163 lb 3.2 oz) 74.9 kg (165 lb 2 oz)       Physical Exam:  GEN:  Awake and alert, on Room air, in no distress   EYES:   Sclera clear. No icterus. PERRL.   ENT:   External ears/nose normal, no oral lesions, no thrush, mucous membranes moist, Mallampati IV airway with enlarged tongue  NECK:  Supple, midline trachea, no JVD  LUNGS: Normal chest on inspection,positive crackles LLL and CTA on R, mild expiratory wheeze worse on the left, diminished bases. Non labored breathing  CV:  Regular rhythm and rate. Normal S1/S2. No murmurs, gallops, or rubs noted.  ABD:  Soft, non-tender and non-distended. Normal bowel sounds. No guarding  EXT:  Moves all extremities well. No cyanosis. No redness. Trace right ankle edema  Skin: dry, intact, no bleeding    Results Review:        Results from last 7 days  Lab Units 03/22/18  0930 03/21/18  0326 03/20/18  0342 03/19/18  0613 03/18/18  2225   SODIUM mmol/L 139 142 139 139 138   POTASSIUM mmol/L 3.6 3.9 3.8 4.2 4.3   CHLORIDE mmol/L 103 109* 106 106 104   CO2 mmol/L 22.8 19.3* 20.2* 19.4* 18.8*   BUN mg/dL 46* 42* 31* 30* 31*   CREATININE mg/dL 1.86* 1.78* 1.51* 1.77* 1.95*   CALCIUM mg/dL 8.7 8.3* 7.6* 7.6* 7.1*   AST (SGOT) U/L  --  13  --  21 17   ALT (SGPT) U/L  --  15  --  23 21   ANION GAP mmol/L 13.2 13.7 12.8 13.6 15.2   ALBUMIN g/dL  --  1.80*  --  2.70* 2.40*       3/18/18  Creatinine was 2.18 at the outlying facility   lactic acid was 4.3   Troponin was 0.05    White count was 1.3                    Results from last 7 days  Lab Units 03/21/18  0326 " 03/20/18  0342 03/19/18  0613 03/18/18  2226   WBC 10*3/mm3 9.59 5.71 3.44* 0.96*   HEMOGLOBIN g/dL 11.4* 11.1* 12.1* 11.2*   HEMATOCRIT % 35.3* 34.6* 37.8* 35.8*   PLATELETS 10*3/mm3 103* 117* 141 139*   MCV fL 93.6 94.0 93.8 95.0   NEUTROPHIL % %  --   --  89.8*  --    LYMPHOCYTE % %  --   --  1.2*  --    MONOCYTES % %  --   --  8.7  --    EOSINOPHIL % %  --   --  0.0*  --    BASOPHIL % %  --   --  0.0  --    IMM GRAN % %  --   --  0.0  --            Results from last 7 days  Lab Units 03/20/18  0342   MAGNESIUM mg/dL 2.1           Invalid input(s): LDLCALC          Glucose   Date/Time Value Ref Range Status   03/22/2018 1114 180 (H) 70 - 130 mg/dL Final   03/22/2018 0633 126 70 - 130 mg/dL Final   03/21/2018 2314 139 (H) 70 - 130 mg/dL Final   03/21/2018 2041 148 (H) 70 - 130 mg/dL Final   03/21/2018 1829 146 (H) 70 - 130 mg/dL Final   03/21/2018 1208 128 70 - 130 mg/dL Final   03/21/2018 0612 109 70 - 130 mg/dL Final   03/20/2018 2343 130 70 - 130 mg/dL Final       Results from last 7 days  Lab Units 03/19/18  0613 03/19/18  0255 03/18/18  2226   LACTATE mmol/L 1.4 1.7 2.9*       Results from last 7 days  Lab Units 03/21/18  1530 03/21/18  1503 03/19/18  0305 03/19/18  0301   BLOODCX  No growth at less than 24 hours No growth at less than 24 hours  --   --    RESPCX   --   --  Rejected  --    GRAM STAIN RESULT   --   --  Many (4+) Epithelial cells seen  No WBCs seen  Many (4+) Mixed bacterial morphotypes seen on Gram Stain  --    STREP PNEUMO AG   --   --   --  Negative   L. PNEUMOPHILA SEROGP 1 UR AG   --   --   --  Negative           Results from last 7 days  Lab Units 03/19/18  0256   ADENOVIRUS DETECTION BY PCR  Not Detected   CORONAVIRUS 229E  Not Detected   CORONAVIRUS HKU1  Not Detected   CORONAVIRUS NL63  Not Detected   CORONAVIRUS OC43  Not Detected   HUMAN METAPNEUMOVIRUS  Not Detected   HUMAN RHINOVIRUS/ENTEROVIRUS  Not Detected   INFLUENZA B PCR  Not Detected   PARAINFLUENZA 1  Not Detected    PARAINFLUENZA VIRUS 2  Not Detected   PARAINFLUENZA VIRUS 3  Not Detected   PARAINFLUENZA VIRUS 4  Not Detected   BORDETELLA PERTUSSIS PCR  Not Detected   CHLAMYDOPHILA PNEUMONIAE PCR  Not Detected   MYCOPLAMA PNEUMO PCR  Not Detected   INFLUENZA A PCR  Not Detected   INFLUENZA A H3  Not Detected   INFLUENZA A H1  Not Detected   RSV, PCR  Not Detected       Results from last 7 days  Lab Units 03/22/18  0850   EOSINOPHIL SMEAR % EOS/100 Cells 0       Overnight oximetry: 3/22/18   no hypoxemia      Imaging:   Imaging Results (all)     None          I reviewed the patient's new clinical results.  I personally viewed and interpreted the patient's imaging results:Central line tip in the superior vena cava, patient have a dense left lung pneumonia which is slightly worse compared to his x-ray on initial presentation to the emergency room and this is significant change compared to his baseline films from earlier.        Medication Review:     ceFAZolin 2 g Intravenous Q12H   enoxaparin 40 mg Subcutaneous Q24H   famotidine 20 mg Oral BID   insulin aspart 0-14 Units Subcutaneous Q6H   nystatin 5 mL Oral 4x Daily   predniSONE 20 mg Oral TID With Meals   sennosides-docusate sodium 2 tablet Oral Nightly            ASSESSMENT:   1. Sepsis with septic shock  2. 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 Pneumonia   4. MSSA bacteremia   5. acute hypoxemic respiratory failure- resolved  6. History of sarcoidosis on prednisone and methotrexate at home  7. Acute kidney injury, improving  8. Leukopenia due to sepsis and methotrexate, Resolved   9. Mild steroid-induced hyperglycemia, already have orders for sliding scale insulin  10. Immunocompromised host on prednisone for almost 2 months and on methotrexate  11. Oral thrush- improved  12. Snoring  13. Daytime sleepiness   14. Protein malnutrition    PLAN:  Ok to shower.   Overnight oximetry showed no hypoxemia and no need for nocturnal oxygen. He does have a history of  snoring and daytime sleepiness and may consider home sleep study as outpatient  Defer antibiotics to ID and if ok for orals consider discharge later today vs tomorrow. If IV will need a PICC line placement first. On Ancef bid   Awaiting echo results   Continue high dose steroids with 20 mg TID. Patient will be on high dose steroids and follow up with Dr. Baum at discharge for weaning and when to add back methotrexate.   Overall clinically improving with stable vitals and improved shortness of breath.   Continue pulmonary hygiene measures and will add flutter and IS     Discussed with the patient and with the family at the bedside    Disposition: home in 1-2 days depending on ID input.   Will need follow up with Dr. Baum in 2 weeks to decide about steroid adjustment and whether to consider resuming methotrexate and consider HST.     Teresa Davenport MD  03/22/18  3:21 PM         Dictated utilizing Dragon dictation:  Much of this encounter note is an electronic transcription/translation of spoken language to printed text. The electronic translation of spoken language may permit erroneous, or at times, nonsensical words or phrases to be inadvertently transcribed; Although I have reviewed the note for such errors, some may still exist.

## 2018-03-22 NOTE — PLAN OF CARE
Problem: Sepsis/Septic Shock (Adult)  Goal: Signs and Symptoms of Listed Potential Problems Will be Absent, Minimized or Managed (Sepsis/Septic Shock)  Outcome: Ongoing (interventions implemented as appropriate)      Problem: Fall Risk (Adult)  Goal: Absence of Fall  Outcome: Ongoing (interventions implemented as appropriate)      Problem: Pneumonia (Adult)  Goal: Signs and Symptoms of Listed Potential Problems Will be Absent, Minimized or Managed (Pneumonia)  Outcome: Ongoing (interventions implemented as appropriate)      Problem: Patient Care Overview  Goal: Plan of Care Review  Outcome: Ongoing (interventions implemented as appropriate)   03/22/18 3736   Coping/Psychosocial   Plan of Care Reviewed With patient   Plan of Care Review   Progress improving   OTHER   Outcome Summary VSS. IV ABX. No c/o pain or SOA. PICC tomorrow and home with IV ABX. Safety maintained, continue to monitor.     Goal: Individualization and Mutuality  Outcome: Ongoing (interventions implemented as appropriate)    Goal: Discharge Needs Assessment  Outcome: Ongoing (interventions implemented as appropriate)    Goal: Interprofessional Rounds/Family Conf  Outcome: Ongoing (interventions implemented as appropriate)

## 2018-03-22 NOTE — PROGRESS NOTES
Continued Stay Note  [facility]     Patient Name: Gilson Sethi  MRN: [id number]  Today's Date: 3/22/2018    Admit Date: 3/18/2018          Discharge Plan     Row Name 03/22/18 1438       Plan    Plan Home with [facility]     Plan Comments [facility] notified for authorization for home infusion.  Pt intake ID number [id number]   Faxed clinicals to [phone]  Need to fax medication order tomorrow after echo.  Pt aware that he is authorized for the out patient care  CCP following    Row Name 03/22/18 1326       Plan    Plan Home with IV antibiotics    Plan Comments [facility] to provide medication for home infusion.  Call to [facility] for Home Health              Discharge Codes    No documentation.           Lala Leiva RN

## 2018-03-22 NOTE — PROGRESS NOTES
LOS: 4 days     Chief Complaint:  Follow-up MSSA septicemia    Interval History:  No acute events. No fevers. WBC normalized. He reports only mild shortness of air this AM worse w/ exertion and relieved w/ rest. He has an associated non-productive cough. He is tolerating antibiotics w/o rash or diarrhea.    ROS: no n/v; no chest pain    Vital Signs  Temp:  [97.6 °F (36.4 °C)-97.9 °F (36.6 °C)] 97.6 °F (36.4 °C)  Heart Rate:  [] 58  Resp:  [18] 18  BP: (109-142)/(71-95) 109/71    Physical Exam:  General: In no acute distress, sitting on side of bed  HEENT: Normocephalic, atraumatic, PERRL, EOMI, no scleral icterus. Oropharynx is clear and moist  Neck: Supple, trachea is midline  Cardiovascular: Normal rate, regular rhythm,  no murmurs, rubs, or gallops    Respiratory: Crackles at the bases (R>L), no wheezing, on room air   GI: Abdomen is soft, non-tender, non-distended  Musculoskeletal: Normal range of motion, no edema, tenderness or deformity  Skin: No rashes or lesions  : No Casarez  Neurological: Alert and oriented x 3, motor strength 5/5 in all four extremities  Psychiatric: Normal mood and affect   Access: Right IJ catheter removed    Antibiotics:  •  ceFAZolin in dextrose (ANCEF) IVPB solution 2 g, 2 g, Intravenous, Q12H, Dilcia Pelayo MD, 2 g at 03/22/18 0421    LABS:  CBC, CMP, micro reviewed today  Lab Results   Component Value Date    WBC 9.59 03/21/2018    HGB 11.4 (L) 03/21/2018    HCT 35.3 (L) 03/21/2018    MCV 93.6 03/21/2018     (L) 03/21/2018     Lab Results   Component Value Date    GLUCOSE 134 (H) 03/21/2018    BUN 42 (H) 03/21/2018    CREATININE 1.78 (H) 03/21/2018    EGFRIFAFRI 47 (L) 03/21/2018    BCR 23.6 03/21/2018    CO2 19.3 (L) 03/21/2018    CALCIUM 8.3 (L) 03/21/2018    ALBUMIN 1.80 (L) 03/21/2018    LABIL2 0.5 03/21/2018    AST 13 03/21/2018    ALT 15 03/21/2018     3/19 Strep pneumo ag neg  3/19 Legionella urein ag neg    Microbiology:  3/18 OSH BCx MSSA (prelim; final  available 3/23/18 per d/w micro lab there)  3/19 SCx neg  3/19 RVP neg  3/21 BCx @ [facility]: NGTD     Radiology (personally reviewed report):   CXR with L lung opacities    Assessment/Plan   1.  MSSA bacteremia and septic shock.   -shock now resolved   -called outside hospital micro lab and inquired re: cultures; mecA gene not detected so preliminary is MSSA; asked them to fax results to me tomorrow  -continue cefazolin 2 g IV q12h  -f/u TTE  -f/u 3/21 BCx documenting clearance which are currently NGTD     2.  Acute hypoxic respiratory failure - resolved     3.  Sarcoidosis on methotrexate and prednisone- complicating above  -spoke w/ pulmonary; MTX on hold; will need long course of steroids     4.  Leukopenia  -due to sepsis and possibly MTX  -resolved     5.  Acute kidney injury   -Crt worse today; 1.7 vs 1.5  - Antibiotics dosed appropriately     Thank you for this consult. ID will follow. I have discussed the antibiotic plan and w/u with Dr Davenport.

## 2018-03-22 NOTE — PROGRESS NOTES
Continued Stay Note  [facility]     Patient Name: Gilson Sethi  MRN: [id number]  Today's Date: 3/22/2018    Admit Date: 3/18/2018          Discharge Plan     Row Name 03/22/18 1326       Plan    Plan Home with IV antibiotics    Plan Comments [facility] to provide medication for home infusion.  Call to [facility] for Home Health              Discharge Codes    No documentation.           Lala Leiva RN

## 2018-03-23 VITALS
HEIGHT: 68 IN | SYSTOLIC BLOOD PRESSURE: 142 MMHG | HEART RATE: 68 BPM | RESPIRATION RATE: 16 BRPM | BODY MASS INDEX: 26.11 KG/M2 | OXYGEN SATURATION: 96 % | WEIGHT: 172.3 LBS | TEMPERATURE: 97.8 F | DIASTOLIC BLOOD PRESSURE: 78 MMHG

## 2018-03-23 LAB
GLUCOSE BLDC GLUCOMTR-MCNC: 149 MG/DL (ref 70–130)
GLUCOSE BLDC GLUCOMTR-MCNC: 149 MG/DL (ref 70–130)
GLUCOSE BLDC GLUCOMTR-MCNC: 177 MG/DL (ref 70–130)

## 2018-03-23 PROCEDURE — C1751 CATH, INF, PER/CENT/MIDLINE: HCPCS

## 2018-03-23 PROCEDURE — 63710000001 PREDNISONE PER 1 MG: Performed by: NURSE PRACTITIONER

## 2018-03-23 PROCEDURE — 94799 UNLISTED PULMONARY SVC/PX: CPT

## 2018-03-23 PROCEDURE — 82962 GLUCOSE BLOOD TEST: CPT

## 2018-03-23 PROCEDURE — 99232 SBSQ HOSP IP/OBS MODERATE 35: CPT | Performed by: INTERNAL MEDICINE

## 2018-03-23 PROCEDURE — 25010000003 CEFAZOLIN IN DEXTROSE 2-4 GM/100ML-% SOLUTION: Performed by: INTERNAL MEDICINE

## 2018-03-23 RX ORDER — SODIUM CHLORIDE 0.9 % (FLUSH) 0.9 %
10 SYRINGE (ML) INJECTION AS NEEDED
Status: DISCONTINUED | OUTPATIENT
Start: 2018-03-23 | End: 2018-03-23 | Stop reason: HOSPADM

## 2018-03-23 RX ORDER — CEFAZOLIN SODIUM 2 G/100ML
2 INJECTION, SOLUTION INTRAVENOUS EVERY 12 HOURS
Qty: 5200 ML | Refills: 0 | Status: SHIPPED | OUTPATIENT
Start: 2018-03-23 | End: 2018-04-18

## 2018-03-23 RX ORDER — PREDNISONE 20 MG/1
40 TABLET ORAL DAILY
Qty: 30 TABLET | Refills: 1 | Status: SHIPPED | OUTPATIENT
Start: 2018-03-23 | End: 2021-08-29

## 2018-03-23 RX ORDER — FAMOTIDINE 20 MG/1
20 TABLET, FILM COATED ORAL 2 TIMES DAILY
Qty: 60 TABLET | Refills: 0 | Status: SHIPPED | OUTPATIENT
Start: 2018-03-23 | End: 2018-04-22

## 2018-03-23 RX ORDER — SODIUM CHLORIDE 0.9 % (FLUSH) 0.9 %
10 SYRINGE (ML) INJECTION EVERY 12 HOURS SCHEDULED
Status: DISCONTINUED | OUTPATIENT
Start: 2018-03-23 | End: 2018-03-23 | Stop reason: HOSPADM

## 2018-03-23 RX ADMIN — FAMOTIDINE 20 MG: 20 TABLET, FILM COATED ORAL at 08:48

## 2018-03-23 RX ADMIN — NYSTATIN 500000 UNITS: 100000 SUSPENSION ORAL at 08:48

## 2018-03-23 RX ADMIN — CEFAZOLIN SODIUM 2 G: 2 INJECTION, SOLUTION INTRAVENOUS at 16:05

## 2018-03-23 RX ADMIN — NYSTATIN 500000 UNITS: 100000 SUSPENSION ORAL at 11:33

## 2018-03-23 RX ADMIN — PREDNISONE 20 MG: 20 TABLET ORAL at 11:33

## 2018-03-23 RX ADMIN — PREDNISONE 20 MG: 20 TABLET ORAL at 08:48

## 2018-03-23 RX ADMIN — CEFAZOLIN SODIUM 2 G: 2 INJECTION, SOLUTION INTRAVENOUS at 04:15

## 2018-03-23 NOTE — NURSING NOTE
Regarding PICC consult.  Chart reviewed -- patient recently had central line that was removed 3/21   had positive blood cultures resulted from another facility and repeat blood cultures drawn from central line just proior to removing,  The preliminary cultures from the central line are showing no growth at this time.  The renal functions are abnormal  Current GFR is 45and have not changes significantly since 3/18 will need further clarification if this would impact recommendation for PICC line placement.  Not sure if patient has nephrology consult at this time. Discussed with bedside nurse will follow with primary physician in am.  Patient currently has PIV access.

## 2018-03-23 NOTE — PROGRESS NOTES
Continued Stay Note  [facility]     Patient Name: Gilson Sethi  MRN: [id number]  Today's Date: 3/23/2018    Admit Date: 3/18/2018          Discharge Plan     Row Name 03/23/18 1002       Plan    Plan Home with IV antibiotic infusions     Plan Comments Order received from Dr. zander [facility] infusion company that [employer] gave order to contacted CCP and stated they had received the authorization for IV antibiotics and Home Health from [employer].  CCP waiting for them to se patient before canceling [facility]              Discharge Codes    No documentation.           Lala Leiva RN

## 2018-03-23 NOTE — PROGRESS NOTES
LOS: 5 days     Chief Complaint: MSSA bacteremia    Interval History:  Afebrile, no new complaints or events, continues to cough.  Denies any abdominal pain nausea vomiting or diarrhea.  Tolerating antibiotics without a rash.    Vital Signs  Temp:  [97.6 °F (36.4 °C)-98.3 °F (36.8 °C)] 97.7 °F (36.5 °C)  Heart Rate:  [58-74] 65  Resp:  [16] 16  BP: (117-156)/(80-93) 156/93    Physical Exam:  General: In no acute distress  Cardiovascular: RRR, no M/R/G  Respiratory: crackles, no wheezing  GI: Soft, NT/ND, + bowel sounds bilaterally  Skin: No rashes     Antibiotics:  Cefazolin 2 g IV every 12 hours     Results Review:     I reviewed the patient's new clinical results.  I reviewed the patient's new imaging results and agree with the interpretation.    Lab Results   Component Value Date    WBC 9.59 03/21/2018    HGB 11.4 (L) 03/21/2018    HCT 35.3 (L) 03/21/2018    MCV 93.6 03/21/2018     (L) 03/21/2018     Lab Results   Component Value Date    GLUCOSE 155 (H) 03/22/2018    BUN 46 (H) 03/22/2018    CREATININE 1.86 (H) 03/22/2018    EGFRIFAFRI 45 (L) 03/22/2018    BCR 24.7 03/22/2018    CO2 22.8 03/22/2018    CALCIUM 8.7 03/22/2018    ALBUMIN 1.80 (L) 03/21/2018    LABIL2 0.5 03/21/2018    AST 13 03/21/2018    ALT 15 03/21/2018       Microbiology:  3/21 BCx NGTD x 2  3/19 SCx neg  3/19 Strep pneumo ag neg  3/19 Legionella urein ag neg  3/19 RVP neg     OSH -   3/18 BCx MSSA (prelim)    TTE EF 36-40%.  Left ventricular global hypokinesis.  Mildly dilated right ventricular cavity.  Mild to moderate MR    Assessment/Plan   1.  MSSA bacteremia and septic shock. Shock now resolved   - Repeat blood cultures are negative to date  - Surface echocardiogram without any evidence of endocarditis  - Given how quickly the patient recovered with resolved fevers and the fact that his repeat blood cultures remain negative I don't think he needs a LOC.  I recommend treating the patient with cefazolin 2 g IV every 12 hours (can  also do a continuous infusion of cefazolin 4 g IV continuous infusion every 24 hours at discharge) for 4 weeks   - Antibiotics stop date 4/18/2018  - Please monitor weekly CBC with differential and creatinine while on IV antibiotics and fax the results to infectious disease clinic at [phone]  - Okay to place PICC line once the blood cultures are negative for 48 hours later today  - No need for ID follow-up     2.  Acute hypoxic respiratory failure - resolved     3.  Sarcoidosis on methotrexate and prednisone- complicating above  - Management per pulmonary medicine  - He is on high dose steroids as an outpatient and if he continues on such he needs to be on PCP prophylaxis     4.  Leukopenia present on admission now resolved most likely secondary to methotrexate     5.  Acute kidney injury - complicating above  - Antibiotics dosed appropriately     6. Thrombocytopenia  - repeat CBC tomorrow     ID will sign off.  Please do not hesitate to cause with further questions or concerns.

## 2018-03-23 NOTE — PROGRESS NOTES
Continued Stay Note  [facility]     Patient Name: Gilson Sethi  MRN: [id number]  Today's Date: 3/23/2018    Admit Date: 3/18/2018          Discharge Plan     Row Name 03/23/18 1444       Plan    Plan Home with [facility]  and [facility] for IV infusions    Plan Comments Spoke with marina [facility] for IV infusions.  Marina will follow.  Spoke to Argenis from [facility] and they will follow up with patient at DC  To HI today              Discharge Codes    No documentation.       Expected Discharge Date and Time     Expected Discharge Date Expected Discharge Time    Mar 23, 2018             Lala Leiva RN

## 2018-03-23 NOTE — DISCHARGE SUMMARY
DISCHARGE SUMMARY    Patient Name: Gilson Sethi  Age/Sex: 62 y.o. male  : 1955  MRN: [id number]  Patient Care Team:  Zach Chi DO as PCP - General (Family Medicine)       Date of Admit: 3/18/2018  Date of Discharge:  18  Discharge Condition: Good    Discharge Diagnoses:  1. Sepsis with septic shock  2. 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 Pneumonia   4. MSSA bacteremia   5. acute hypoxemic respiratory failure- resolved  6. History of sarcoidosis on prednisone and methotrexate at home  7. Acute kidney injury, improving  8. Leukopenia due to sepsis and methotrexate, Resolved   9. Mild steroid-induced hyperglycemia, already have orders for sliding scale insulin  10. Immunocompromised host on prednisone for almost 2 months and on methotrexate  11. Oral thrush- improved  12. Snoring  13. Daytime sleepiness   14. Protein malnutrition    History of present illness from H&P from 3/18/2018:   Mr. Sethi is a 63yo AAM with a history of Sarcoidosis diagnosed by Dr. Baum back in  of this year.  He has been on methotrexate and prednisone at home.  He had sudden onset of cough and diarrhea three days ago and felt sick in general. He had fever.  He passed out in the bathroom and struck his head and was brought to the ER at [facility].  He was hypotensive there and started on dopamine. He got zosyn and 3+liters of fluid as well it looks like.     Hospital Course:   Gilson Sethi presented to [facility] with complaints of SOA. He has history of sarcoidosis followed by Dr. Den Baum who was being treated with methotrexate and prednisone.  He developed progressive cough and shortness of breath and ended up in the emergency room after he fainted and was found to be hypotensive and septic shock and was started on dopamine and Zosyn and transferred to [facility] for further care. Upon arrival he was started on stress dose steroid.  Zosyn was  continued and Zithromax was added. he was also on the vitamin C/thiamine/mineralocorticoid as well. Viral panel, strep pneumonia and legionella were negative. He was found to be in acute renal failure with acute hypoxemia and lactic acidosis with hyponatremia and leukopenia. He was weaned off the dopamine drip and blood pressure has been stable.  His blood culture came back positive for MSSA and he was seen by ID and will be discharged on IV antibiotics for total of 4 weeks with Cefazolin  Need to follow up on the renal function on his follow up with Dr Baum as well  Consults:   IP CONSULT TO INFECTIOUS DISEASES  IP CONSULT TO IV TEAM    Significant Discharge Diagnostics   Procedures Performed:  none       Pertinent Lab Results:    Results from last 7 days  Lab Units 03/22/18  0930 03/21/18 0326 03/20/18 0342 03/19/18 0613 03/18/18  2225   SODIUM mmol/L 139 142 139 139 138   POTASSIUM mmol/L 3.6 3.9 3.8 4.2 4.3   CHLORIDE mmol/L 103 109* 106 106 104   CO2 mmol/L 22.8 19.3* 20.2* 19.4* 18.8*   BUN mg/dL 46* 42* 31* 30* 31*   CREATININE mg/dL 1.86* 1.78* 1.51* 1.77* 1.95*   GLUCOSE mg/dL 155* 134* 128* 157* 146*   CALCIUM mg/dL 8.7 8.3* 7.6* 7.6* 7.1*   AST (SGOT) U/L  --  13  --  21 17   ALT (SGPT) U/L  --  15  --  23 21           Results from last 7 days  Lab Units 03/21/18  0326 03/20/18 0342 03/19/18 0613 03/18/18  2226   WBC 10*3/mm3 9.59 5.71 3.44* 0.96*   HEMOGLOBIN g/dL 11.4* 11.1* 12.1* 11.2*   HEMATOCRIT % 35.3* 34.6* 37.8* 35.8*   PLATELETS 10*3/mm3 103* 117* 141 139*   MCV fL 93.6 94.0 93.8 95.0   MCH pg 30.2 30.2 30.0 29.7   MCHC g/dL 32.3* 32.1* 32.0* 31.3*   RDW % 24.7* 24.4* 23.8* 23.5*   RDW-SD fl 78.3* 77.9* 76.3* 75.8*   MPV fL 10.3 10.2 9.9 9.9   NEUTROPHIL % %  --   --  89.8*  --    LYMPHOCYTE % %  --   --  1.2*  --    MONOCYTES % %  --   --  8.7  --    EOSINOPHIL % %  --   --  0.0*  --    BASOPHIL % %  --   --  0.0  --    IMM GRAN % %  --   --  0.0  --    NEUTROS ABS 10*3/mm3  --   --   3.09 0.73*   LYMPHS ABS 10*3/mm3  --   --  0.04*  --    MONOS ABS 10*3/mm3  --   --  0.30  --    EOS ABS 10*3/mm3  --   --  0.00 0.01   BASOS ABS 10*3/mm3  --   --  0.00  --    IMMATURE GRANS (ABS) 10*3/mm3  --   --  0.00  --    NRBC /100 WBC  --   --  1.6* 2.0*           Results from last 7 days  Lab Units 03/20/18  0342   MAGNESIUM mg/dL 2.1                       Results from last 7 days  Lab Units 03/19/18  0613 03/19/18  0255 03/18/18  2226   LACTATE mmol/L 1.4 1.7 2.9*               Results from last 7 days  Lab Units 03/21/18  1530 03/21/18  1503 03/19/18  0305   BLOODCX  No growth at 24 hours No growth at 24 hours  --    RESPCX   --   --  Rejected   GRAM STAIN RESULT   --   --  Many (4+) Epithelial cells seen  No WBCs seen  Many (4+) Mixed bacterial morphotypes seen on Gram Stain           Results from last 7 days  Lab Units 03/19/18  0256   ADENOVIRUS DETECTION BY PCR  Not Detected   CORONAVIRUS 229E  Not Detected   CORONAVIRUS HKU1  Not Detected   CORONAVIRUS NL63  Not Detected   CORONAVIRUS OC43  Not Detected   HUMAN METAPNEUMOVIRUS  Not Detected   HUMAN RHINOVIRUS/ENTEROVIRUS  Not Detected   INFLUENZA B PCR  Not Detected   PARAINFLUENZA 1  Not Detected   PARAINFLUENZA VIRUS 2  Not Detected   PARAINFLUENZA VIRUS 3  Not Detected   PARAINFLUENZA VIRUS 4  Not Detected   BORDETELLA PERTUSSIS PCR  Not Detected   CHLAMYDOPHILA PNEUMONIAE PCR  Not Detected   MYCOPLAMA PNEUMO PCR  Not Detected   INFLUENZA A PCR  Not Detected   INFLUENZA A H3  Not Detected   INFLUENZA A H1  Not Detected   RSV, PCR  Not Detected       Results from last 7 days  Lab Units 03/22/18  0850   EOSINOPHIL SMEAR % EOS/100 Cells 0     Overnight oximetry: 3/22/18   no hypoxemia      Imaging:       Imaging Results (all)      None           Imaging Results:  Imaging Results (all)     Procedure Component Value Units Date/Time    XR Chest 1 View [[id number]] Collected:  03/20/18 0446     Updated:  03/20/18 0446    Narrative:       X-RAY CHEST 1  VIEW.     HISTORY: Follow-up respiratory failure.     COMPARISON: 3/19/1980.     FINDINGS:  Cardiomediastinal silhouette is within normal limits. Right jugular  catheter tip is at the cavoatrial junction, no significant change.     Opacities in the left lung demonstrated no significant change.              Impression:       Infiltrates in the left lung, overall no significant change.           XR Chest Post CVA Port [[id number]] Collected:  03/19/18 1143     Updated:  03/19/18 1222    Narrative:       AP PORTABLE CHEST     HISTORY: Central line placement. Pneumonia.     COMPARISON: Portable chest 03/18/2018, 01/11/2018.     FINDINGS: Right IJ PAC has been placed with tip in the SVC. There is a  left pleural effusion with left apical cap. Left apical and left basilar  opacities are present consistent with pneumonia and this is superimposed  on chronic changes in the left lung. There is chronic interstitial  disease.       Impression:       1. Placement of right IJ central venous catheter with tip in the SVC.  2. Increased left apical and basilar opacities consistent with pneumonia  superimposed on chronic emphysema. Left pleural effusion has developed  with left apical cap.     This report was finalized on 3/19/2018 12:19 PM by Dr. Kit Jones MD.             Objective:   Temp:  [97.6 °F (36.4 °C)-97.9 °F (36.6 °C)] 97.8 °F (36.6 °C)  Heart Rate:  [58-68] 68  Resp:  [16] 16  BP: (141-156)/(78-93) 142/78   SpO2:  [94 %-97 %] 96 %  on    Device (Oxygen Therapy): room air    Intake/Output Summary (Last 24 hours) at 03/23/18 1426  Last data filed at 03/23/18 0744   Gross per 24 hour   Intake                0 ml   Output              950 ml   Net             -950 ml     Body mass index is 26.2 kg/m².  1    03/21/18  0458 03/22/18  0558 03/23/18  0521   Weight: 74 kg (163 lb 3.2 oz) 74.9 kg (165 lb 2 oz) 78.2 kg (172 lb 4.8 oz)     Weight change: 3.255 kg (7 lb 2.8 oz)    Physical Exam:  GEN:  Awake and alert, on Room  air, in no distress   EYES:   Sclera clear. No icterus. PERRL.   ENT:   External ears/nose normal, no oral lesions, no thrush, mucous membranes moist, Mallampati IV airway with enlarged tongue  NECK:  Supple, midline trachea, no JVD  LUNGS: Normal chest on inspection,positive crackles LLL and CTA on R, mild expiratory wheeze worse on the left, diminished bases. Non labored breathing  CV:  Regular rhythm and rate. Normal S1/S2. No murmurs, gallops, or rubs noted.  ABD:  Soft, non-tender and non-distended. Normal bowel sounds. No guarding  EXT:  Moves all extremities well. No cyanosis. No redness. Trace right ankle edema  Skin: dry, intact, no bleeding    Discharge Medications and Instructions:     Discharge Medications   Gilson Sethi   Home Medication Instructions TAINA:[id number]    Printed on:03/23/18 1421   Medication Information                      ceFAZolin in dextrose (ANCEF) 2-4 GM/100ML-% solution IVPB  Infuse 100 mL into a venous catheter Every 12 (Twelve) Hours for 26 days.             famotidine (PEPCID) 20 MG tablet  Take 1 tablet by mouth 2 (Two) Times a Day for 30 days.             nisoldipine (SULAR) 8.5 MG 24 hr tablet  Take 8.5 mg by mouth Daily.             PARoxetine (PAXIL) 20 MG tablet  Take 20 mg by mouth Every Morning.             prednisoLONE acetate (PRED FORTE) 1 % ophthalmic suspension  1 drop 4 (Four) Times a Day.             predniSONE (DELTASONE) 20 MG tablet  Take 2 tablets by mouth Daily.                 Discharge Diet:    Dietary Orders     Start     Ordered    03/20/18 1152  Diet Regular; Consistent Carbohydrate  Diet Effective Now     Question Answer Comment   Diet Texture / Consistency Regular    Common Modifiers Consistent Carbohydrate        03/20/18 1151          Activity at Discharge:       Discharge disposition: Home    Discharge Instructions and Follow ups:  With Dr Baum in 2 weeks with CXR and BMP  Follow-up Information     DO Evette Mercado    Specialty:   Family Medicine  Contact information:  [facility] [address] [phone]                 No future appointments.     Medication Reconciliation: Please see electronically completed Med Rec.    Total time spent discharging patient including evaluation, medication reconciliation, arranging follow up, and post hospitalization instructions and education total time exceeds 30 minutes.     Teresa Davenport MD  03/23/18  2:26 PM      Dictated utilizing Dragon dictation:  Much of this encounter note is an electronic transcription/translation of spoken language to printed text. The electronic translation of spoken language may permit erroneous, or at times, nonsensical words or phrases to be inadvertently transcribed; Although I have reviewed the note for such errors, some may still exist.

## 2018-03-23 NOTE — PROGRESS NOTES
Continued Stay Note  [facility]     Patient Name: Gilson Sethi  MRN: [id number]  Today's Date: 3/23/2018    Admit Date: 3/18/2018          Discharge Plan     Row Name 03/23/18 1619       Plan    Plan [facility] and [facility]    Plan Comments spoke to marry  She gave pt teaching for infusions  Pt will also have [facility]    Row Name 03/23/18 1444       Plan    Plan Home with [facility]  and [facility] for IV infusions    Plan Comments Spoke with marina [facility] for IV infusions.  Marina will follow.  Spoke to Marry from [facility] and they will follow up with patient at DC  To [place] today              Discharge Codes    No documentation.       Expected Discharge Date and Time     Expected Discharge Date Expected Discharge Time    Mar 23, 2018             Lala Leiva RN

## 2018-03-23 NOTE — PROGRESS NOTES
Continued Stay Note  [facility]     Patient Name: Gilson Sethi  MRN: [id number]  Today's Date: 3/23/2018    Admit Date: 3/18/2018          Discharge Plan     Row Name 03/23/18 1002       Plan    Plan Home with IV antibiotic infusions     Plan Comments Order received from Dr. Pelayo Faxed to [facility].  Spoke with Argenis with [facility] who advised to notify [facility] to verify that [facility] was given authorization for home antibiotic infusion.  [facility] will notify CCP and Patient               Discharge Codes    No documentation.           Lala Leiva RN

## 2018-03-23 NOTE — PLAN OF CARE
Problem: Fall Risk (Adult)  Goal: Absence of Fall  Outcome: Ongoing (interventions implemented as appropriate)      Problem: Pneumonia (Adult)  Goal: Signs and Symptoms of Listed Potential Problems Will be Absent, Minimized or Managed (Pneumonia)  Outcome: Ongoing (interventions implemented as appropriate)      Problem: Patient Care Overview  Goal: Plan of Care Review  Outcome: Ongoing (interventions implemented as appropriate)   03/23/18 0343   Coping/Psychosocial   Plan of Care Reviewed With patient   Plan of Care Review   Progress improving   OTHER   Outcome Summary NO ACUTE DISTRESS NOTED THIS SHIFT, VSS AND WNL, PICC LINE TO BE INSERTED ON 3/23 FOR OUT PATIENT ATBS CONTINUE PLAN OF CARE     Goal: Individualization and Mutuality  Outcome: Ongoing (interventions implemented as appropriate)    Goal: Discharge Needs Assessment  Outcome: Ongoing (interventions implemented as appropriate)

## 2018-03-26 ENCOUNTER — TELEPHONE (OUTPATIENT)
Dept: INFECTIOUS DISEASES | Facility: CLINIC | Age: 63
End: 2018-03-26

## 2018-03-26 LAB
BACTERIA SPEC AEROBE CULT: NORMAL
BACTERIA SPEC AEROBE CULT: NORMAL

## 2018-03-26 NOTE — PROGRESS NOTES
Case Management Discharge Note    Final Note: [facility] with [facility] for [facility] infusions  [facility] to provide nursing    Destination     No service coordination in this encounter.      Durable Medical Equipment - Selection Complete     Service Request Status Selected Specialties Address Phone Number Fax Number    [facility] Services [address] [address] [address] [phone]      Dialysis/Infusion     No service coordination in this encounter.      Home Medical Care - Selection Complete     Service Request Status Selected Specialties Address Phone Number Fax Number    [facility] Services [id number] [place] [address] [phone]    [facility] Pending - Request Sent N/A [address], Psychiatric [address] [phone]    [facility] [place] Declined  out of network N/A [address] [facility] [address] [facility] [address] [phone]        Lala Leiva, RN 3/21/2018 1344    Spoke to bridget  Pt needs infusions from [facility]                 Social Care     No service coordination in this encounter.             Final Discharge Disposition Code: 06 - home with home health care

## 2022-02-11 ENCOUNTER — TRANSCRIBE ORDERS (OUTPATIENT)
Dept: ADMINISTRATIVE | Facility: HOSPITAL | Age: 67
End: 2022-02-11

## 2022-02-11 DIAGNOSIS — D86.9 SARCOIDOSIS: Primary | ICD-10-CM

## 2022-05-13 ENCOUNTER — HOSPITAL ENCOUNTER (OUTPATIENT)
Dept: CT IMAGING | Facility: HOSPITAL | Age: 67
Discharge: HOME | End: 2022-05-13
Admitting: INTERNAL MEDICINE

## 2022-05-13 DIAGNOSIS — D86.9 SARCOIDOSIS: ICD-10-CM

## 2022-05-13 PROCEDURE — 71250 CT THORAX DX C-: CPT

## 2022-11-30 ENCOUNTER — TRANSCRIBE ORDERS (OUTPATIENT)
Dept: ADMINISTRATIVE | Facility: HOSPITAL | Age: 67
End: 2022-11-30

## 2022-11-30 DIAGNOSIS — I27.20 PULMONARY HTN: Primary | ICD-10-CM

## 2023-05-17 ENCOUNTER — HOSPITAL ENCOUNTER (OUTPATIENT)
Dept: CARDIOLOGY | Facility: HOSPITAL | Age: 68
Discharge: HOME | End: 2023-05-17
Admitting: INTERNAL MEDICINE
Payer: MEDICARE

## 2023-05-17 VITALS
SYSTOLIC BLOOD PRESSURE: 145 MMHG | DIASTOLIC BLOOD PRESSURE: 73 MMHG | HEIGHT: 69 IN | WEIGHT: 170 LBS | BODY MASS INDEX: 25.18 KG/M2 | HEART RATE: 70 BPM

## 2023-05-17 DIAGNOSIS — I27.20 PULMONARY HTN: ICD-10-CM

## 2023-05-17 LAB
AORTIC ARCH: 2.5 CM
AORTIC DIMENSIONLESS INDEX: 1 (DI)
ASCENDING AORTA: 2.9 CM
BH CV ECHO MEAS - ACS: 2.5 CM
BH CV ECHO MEAS - AO MAX PG: 5.3 MMHG
BH CV ECHO MEAS - AO MEAN PG: 2.8 MMHG
BH CV ECHO MEAS - AO ROOT AREA (BSA CORRECTED): 2 CM2
BH CV ECHO MEAS - AO ROOT DIAM: 3.8 CM
BH CV ECHO MEAS - AO V2 MAX: 114.6 CM/SEC
BH CV ECHO MEAS - AO V2 VTI: 23 CM
BH CV ECHO MEAS - AVA(I,D): 4 CM2
BH CV ECHO MEAS - CONTRAST EF (2CH): 62.3 CM2
BH CV ECHO MEAS - CONTRAST EF 4CH: 53.6 CM2
BH CV ECHO MEAS - EDV(CUBED): 102.9 ML
BH CV ECHO MEAS - EDV(MOD-SP2): 69 ML
BH CV ECHO MEAS - EDV(MOD-SP4): 69 ML
BH CV ECHO MEAS - EF(MOD-BP): 57.2 %
BH CV ECHO MEAS - ESV(CUBED): 24.4 ML
BH CV ECHO MEAS - ESV(MOD-SP2): 26 ML
BH CV ECHO MEAS - ESV(MOD-SP4): 32 ML
BH CV ECHO MEAS - FS: 38.1 %
BH CV ECHO MEAS - IVS/LVPW: 1 CM
BH CV ECHO MEAS - IVSD: 1 CM
BH CV ECHO MEAS - LA A2CS (ATRIAL LENGTH): 4.3 CM
BH CV ECHO MEAS - LA A4C LENGTH: 4.8 CM
BH CV ECHO MEAS - LAT PEAK E' VEL: 7.9 CM/SEC
BH CV ECHO MEAS - LV DIASTOLIC VOL/BSA (35-75): 35.8 CM2
BH CV ECHO MEAS - LV MASS(C)D: 163.4 GRAMS
BH CV ECHO MEAS - LV MAX PG: 5.1 MMHG
BH CV ECHO MEAS - LV MEAN PG: 2.5 MMHG
BH CV ECHO MEAS - LV SYSTOLIC VOL/BSA (12-30): 16.6 CM2
BH CV ECHO MEAS - LV V1 MAX: 113.2 CM/SEC
BH CV ECHO MEAS - LV V1 VTI: 24.3 CM
BH CV ECHO MEAS - LVIDD: 4.7 CM
BH CV ECHO MEAS - LVIDS: 2.9 CM
BH CV ECHO MEAS - LVOT AREA: 3.8 CM2
BH CV ECHO MEAS - LVOT DIAM: 2.21 CM
BH CV ECHO MEAS - LVPWD: 1 CM
BH CV ECHO MEAS - MED PEAK E' VEL: 8.4 CM/SEC
BH CV ECHO MEAS - MR MAX PG: 3.2 MMHG
BH CV ECHO MEAS - MR MAX VEL: 89.9 CM/SEC
BH CV ECHO MEAS - MV A DUR: 0.12 SEC
BH CV ECHO MEAS - MV A MAX VEL: 81.5 CM/SEC
BH CV ECHO MEAS - MV DEC SLOPE: 214.7 CM/SEC2
BH CV ECHO MEAS - MV DEC TIME: 0.31 MSEC
BH CV ECHO MEAS - MV E MAX VEL: 54.7 CM/SEC
BH CV ECHO MEAS - MV E/A: 0.67
BH CV ECHO MEAS - MV MAX PG: 2.46 MMHG
BH CV ECHO MEAS - MV MEAN PG: 0.87 MMHG
BH CV ECHO MEAS - MV P1/2T: 89.7 MSEC
BH CV ECHO MEAS - MV V2 VTI: 22.4 CM
BH CV ECHO MEAS - MVA(P1/2T): 2.45 CM2
BH CV ECHO MEAS - MVA(VTI): 4.2 CM2
BH CV ECHO MEAS - PA ACC TIME: 0.06 SEC
BH CV ECHO MEAS - PA PR(ACCEL): 54 MMHG
BH CV ECHO MEAS - PA V2 MAX: 127.6 CM/SEC
BH CV ECHO MEAS - PULM A REVS DUR: 0.1 SEC
BH CV ECHO MEAS - PULM A REVS VEL: 42.8 CM/SEC
BH CV ECHO MEAS - PULM DIAS VEL: 32.7 CM/SEC
BH CV ECHO MEAS - PULM S/D: 2.02
BH CV ECHO MEAS - PULM SYS VEL: 65.9 CM/SEC
BH CV ECHO MEAS - QP/QS: 0.34
BH CV ECHO MEAS - RAP SYSTOLE: 3 MMHG
BH CV ECHO MEAS - RV MAX PG: 4.4 MMHG
BH CV ECHO MEAS - RV V1 MAX: 104.3 CM/SEC
BH CV ECHO MEAS - RV V1 VTI: 16.6 CM
BH CV ECHO MEAS - RVOT DIAM: 1.56 CM
BH CV ECHO MEAS - RVSP: 20 MMHG
BH CV ECHO MEAS - SI(MOD-SP2): 22.3 ML/M2
BH CV ECHO MEAS - SI(MOD-SP4): 19.2 ML/M2
BH CV ECHO MEAS - SUP REN AO DIAM: 2 CM
BH CV ECHO MEAS - SV(LVOT): 92.9 ML
BH CV ECHO MEAS - SV(MOD-SP2): 43 ML
BH CV ECHO MEAS - SV(MOD-SP4): 37 ML
BH CV ECHO MEAS - SV(RVOT): 31.9 ML
BH CV ECHO MEAS - TAPSE (>1.6): 2.1 CM
BH CV ECHO MEAS - TR MAX PG: 17.5 MMHG
BH CV ECHO MEAS - TR MAX VEL: 209 CM/SEC
BH CV ECHO MEASUREMENTS AVERAGE E/E' RATIO: 6.71
BH CV XLRA - RV BASE: 2.6 CM
BH CV XLRA - TDI S': 15 CM/SEC
LEFT ATRIUM VOLUME INDEX: 24.1 ML/M2
MAXIMAL PREDICTED HEART RATE: 154 BPM
SINUS: 3 CM
STJ: 2.34 CM
STRESS TARGET HR: 131 BPM

## 2023-05-17 PROCEDURE — 25510000001 PERFLUTREN (DEFINITY) 8.476 MG IN SODIUM CHLORIDE (PF) 0.9 % 10 ML INJECTION: Performed by: INTERNAL MEDICINE

## 2023-05-17 PROCEDURE — 93306 TTE W/DOPPLER COMPLETE: CPT

## 2023-05-17 PROCEDURE — 93306 TTE W/DOPPLER COMPLETE: CPT | Performed by: INTERNAL MEDICINE

## 2023-05-17 RX ADMIN — SODIUM CHLORIDE 4 ML: 9 INJECTION INTRAMUSCULAR; INTRAVENOUS; SUBCUTANEOUS at 08:49

## 2024-04-17 ENCOUNTER — TRANSCRIBE ORDERS (OUTPATIENT)
Dept: ADMINISTRATIVE | Facility: HOSPITAL | Age: 69
End: 2024-04-17
Payer: MEDICARE

## 2024-04-17 DIAGNOSIS — D86.9 SARCOIDOSIS: Primary | ICD-10-CM

## 2024-10-11 NOTE — PAYOR COMM NOTE
"Gilson Sethi (62 y.o. Male)     PLEASE SEE ATTACHED DC SUMMARY    REF#[id number]    THANK YOU    HIRAM BERNARDA PRUITT [facility]            Date of Birth Social Security Number Address Home Phone MRN    1955  [address] [facility] [address] [phone] [id number]    Mormonism Marital Status          None        Admission Date Admission Type Admitting Provider Attending Provider Department, Room/Bed    3/18/18 Urgent Teresa Davenport MD  [address], 430/1    Discharge Date Discharge Disposition Discharge Destination        3/23/2018 Home or Self Care              Attending Provider:  (none)   Allergies:  No Known Allergies    Isolation:  None   Infection:  None   Code Status:  Prior    Ht:  172.7 cm (68\")   Wt:  78.2 kg (172 lb 4.8 oz)    Admission Cmt:  [employer]   Principal Problem:  None                Active Insurance as of 3/18/2018     Primary Coverage     Payor Plan Insurance Group Employer/Plan Group    CIGNA MISC CIGNA [id number]     Coverage Address Coverage Phone Number Effective From Effective To    [address] [phone] 2014     [place] [address]       Subscriber Name Subscriber Birth Date Member ID       GILSON SETHI 1955 [id number]                 Emergency Contacts      (Rel.) Home Phone Work Phone Mobile Phone    Marcie Sethi (Spouse) [phone] -- [phone]    Alex Sethi (Brother) -- -- [phone]               Discharge Summary      Teresa Davenport MD at 3/23/2018  2:19 PM            DISCHARGE SUMMARY    Patient Name: Gilson Sethi  Age/Sex: 62 y.o. male  : 1955  MRN: [id number]  Patient Care Team:  Zach Chi DO as PCP - General (Family Medicine)       Date of Admit: 3/18/2018  Date of Discharge:  18  Discharge Condition: Good    Discharge Diagnoses:  1. Sepsis with septic shock  2. 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 Pneumonia   4. MSSA bacteremia   5. acute " Pt advised and verbalized an understanding.   hypoxemic respiratory failure- resolved  6. History of sarcoidosis on prednisone and methotrexate at home  7. Acute kidney injury, improving  8. Leukopenia due to sepsis and methotrexate, Resolved   9. Mild steroid-induced hyperglycemia, already have orders for sliding scale insulin  10. Immunocompromised host on prednisone for almost 2 months and on methotrexate  11. Oral thrush- improved  12. Snoring  13. Daytime sleepiness   14. Protein malnutrition    History of present illness from H&P from 3/18/2018:   Mr. Sethi is a 61yo AAM with a history of Sarcoidosis diagnosed by Dr. Baum back in Jan of this year.  He has been on methotrexate and prednisone at home.  He had sudden onset of cough and diarrhea three days ago and felt sick in general. He had fever.  He passed out in the bathroom and struck his head and was brought to the ER at [facility].  He was hypotensive there and started on dopamine. He got zosyn and 3+liters of fluid as well it looks like.     Hospital Course:   Gilson Sethi presented to [facility] with complaints of SOA. He has history of sarcoidosis followed by Dr. Den Baum who was being treated with methotrexate and prednisone.  He developed progressive cough and shortness of breath and ended up in the emergency room after he fainted and was found to be hypotensive and septic shock and was started on dopamine and Zosyn and transferred to [facility] for further care. Upon arrival he was started on stress dose steroid.  Zosyn was continued and Zithromax was added. he was also on the vitamin C/thiamine/mineralocorticoid as well. Viral panel, strep pneumonia and legionella were negative. He was found to be in acute renal failure with acute hypoxemia and lactic acidosis with hyponatremia and leukopenia. He was weaned off the dopamine drip and blood pressure has been stable.  His blood culture came back positive for MSSA and he was seen by ID and will be  discharged on IV antibiotics for total of 4 weeks with Cefazolin  Need to follow up on the renal function on his follow up with Dr Baum as well  Consults:   IP CONSULT TO INFECTIOUS DISEASES  IP CONSULT TO IV TEAM    Significant Discharge Diagnostics   Procedures Performed:  none       Pertinent Lab Results:    Results from last 7 days  Lab Units 03/22/18  0930 03/21/18  0326 03/20/18  0342 03/19/18  0613 03/18/18  2225   SODIUM mmol/L 139 142 139 139 138   POTASSIUM mmol/L 3.6 3.9 3.8 4.2 4.3   CHLORIDE mmol/L 103 109* 106 106 104   CO2 mmol/L 22.8 19.3* 20.2* 19.4* 18.8*   BUN mg/dL 46* 42* 31* 30* 31*   CREATININE mg/dL 1.86* 1.78* 1.51* 1.77* 1.95*   GLUCOSE mg/dL 155* 134* 128* 157* 146*   CALCIUM mg/dL 8.7 8.3* 7.6* 7.6* 7.1*   AST (SGOT) U/L  --  13  --  21 17   ALT (SGPT) U/L  --  15  --  23 21           Results from last 7 days  Lab Units 03/21/18  0326 03/20/18  0342 03/19/18  0613 03/18/18  2226   WBC 10*3/mm3 9.59 5.71 3.44* 0.96*   HEMOGLOBIN g/dL 11.4* 11.1* 12.1* 11.2*   HEMATOCRIT % 35.3* 34.6* 37.8* 35.8*   PLATELETS 10*3/mm3 103* 117* 141 139*   MCV fL 93.6 94.0 93.8 95.0   MCH pg 30.2 30.2 30.0 29.7   MCHC g/dL 32.3* 32.1* 32.0* 31.3*   RDW % 24.7* 24.4* 23.8* 23.5*   RDW-SD fl 78.3* 77.9* 76.3* 75.8*   MPV fL 10.3 10.2 9.9 9.9   NEUTROPHIL % %  --   --  89.8*  --    LYMPHOCYTE % %  --   --  1.2*  --    MONOCYTES % %  --   --  8.7  --    EOSINOPHIL % %  --   --  0.0*  --    BASOPHIL % %  --   --  0.0  --    IMM GRAN % %  --   --  0.0  --    NEUTROS ABS 10*3/mm3  --   --  3.09 0.73*   LYMPHS ABS 10*3/mm3  --   --  0.04*  --    MONOS ABS 10*3/mm3  --   --  0.30  --    EOS ABS 10*3/mm3  --   --  0.00 0.01   BASOS ABS 10*3/mm3  --   --  0.00  --    IMMATURE GRANS (ABS) 10*3/mm3  --   --  0.00  --    NRBC /100 WBC  --   --  1.6* 2.0*           Results from last 7 days  Lab Units 03/20/18  0342   MAGNESIUM mg/dL 2.1                       Results from last 7 days  Lab Units 03/19/18  0613  03/19/18  0255 03/18/18  2226   LACTATE mmol/L 1.4 1.7 2.9*               Results from last 7 days  Lab Units 03/21/18  1530 03/21/18  1503 03/19/18  0305   BLOODCX  No growth at 24 hours No growth at 24 hours  --    RESPCX   --   --  Rejected   GRAM STAIN RESULT   --   --  Many (4+) Epithelial cells seen  No WBCs seen  Many (4+) Mixed bacterial morphotypes seen on Gram Stain           Results from last 7 days  Lab Units 03/19/18  0256   ADENOVIRUS DETECTION BY PCR  Not Detected   CORONAVIRUS 229E  Not Detected   CORONAVIRUS HKU1  Not Detected   CORONAVIRUS NL63  Not Detected   CORONAVIRUS OC43  Not Detected   HUMAN METAPNEUMOVIRUS  Not Detected   HUMAN RHINOVIRUS/ENTEROVIRUS  Not Detected   INFLUENZA B PCR  Not Detected   PARAINFLUENZA 1  Not Detected   PARAINFLUENZA VIRUS 2  Not Detected   PARAINFLUENZA VIRUS 3  Not Detected   PARAINFLUENZA VIRUS 4  Not Detected   BORDETELLA PERTUSSIS PCR  Not Detected   CHLAMYDOPHILA PNEUMONIAE PCR  Not Detected   MYCOPLAMA PNEUMO PCR  Not Detected   INFLUENZA A PCR  Not Detected   INFLUENZA A H3  Not Detected   INFLUENZA A H1  Not Detected   RSV, PCR  Not Detected       Results from last 7 days  Lab Units 03/22/18  0850   EOSINOPHIL SMEAR % EOS/100 Cells 0     Overnight oximetry: 3/22/18   no hypoxemia      Imaging:       Imaging Results (all)      None           Imaging Results:  Imaging Results (all)     Procedure Component Value Units Date/Time    XR Chest 1 View [[id number]] Collected:  03/20/18 0446     Updated:  03/20/18 0446    Narrative:       X-RAY CHEST 1 VIEW.     HISTORY: Follow-up respiratory failure.     COMPARISON: 3/19/1980.     FINDINGS:  Cardiomediastinal silhouette is within normal limits. Right jugular  catheter tip is at the cavoatrial junction, no significant change.     Opacities in the left lung demonstrated no significant change.              Impression:       Infiltrates in the left lung, overall no significant change.           XR Chest Post CVA Port  [[id number]] Collected:  03/19/18 1143     Updated:  03/19/18 1222    Narrative:       AP PORTABLE CHEST     HISTORY: Central line placement. Pneumonia.     COMPARISON: Portable chest 03/18/2018, 01/11/2018.     FINDINGS: Right IJ PAC has been placed with tip in the SVC. There is a  left pleural effusion with left apical cap. Left apical and left basilar  opacities are present consistent with pneumonia and this is superimposed  on chronic changes in the left lung. There is chronic interstitial  disease.       Impression:       1. Placement of right IJ central venous catheter with tip in the SVC.  2. Increased left apical and basilar opacities consistent with pneumonia  superimposed on chronic emphysema. Left pleural effusion has developed  with left apical cap.     This report was finalized on 3/19/2018 12:19 PM by Dr. Kit Jones MD.             Objective:   Temp:  [97.6 °F (36.4 °C)-97.9 °F (36.6 °C)] 97.8 °F (36.6 °C)  Heart Rate:  [58-68] 68  Resp:  [16] 16  BP: (141-156)/(78-93) 142/78   SpO2:  [94 %-97 %] 96 %  on    Device (Oxygen Therapy): room air    Intake/Output Summary (Last 24 hours) at 03/23/18 1426  Last data filed at 03/23/18 0744   Gross per 24 hour   Intake                0 ml   Output              950 ml   Net             -950 ml     Body mass index is 26.2 kg/m².  1    03/21/18  0458 03/22/18  0558 03/23/18  0521   Weight: 74 kg (163 lb 3.2 oz) 74.9 kg (165 lb 2 oz) 78.2 kg (172 lb 4.8 oz)     Weight change: 3.255 kg (7 lb 2.8 oz)    Physical Exam:  GEN:  Awake and alert, on Room air, in no distress   EYES:   Sclera clear. No icterus. PERRL.   ENT:   External ears/nose normal, no oral lesions, no thrush, mucous membranes moist, Mallampati IV airway with enlarged tongue  NECK:  Supple, midline trachea, no JVD  LUNGS: Normal chest on inspection,positive crackles LLL and CTA on R, mild expiratory wheeze worse on the left, diminished bases. Non labored breathing  CV:  Regular rhythm and rate.  Normal S1/S2. No murmurs, gallops, or rubs noted.  ABD:  Soft, non-tender and non-distended. Normal bowel sounds. No guarding  EXT:  Moves all extremities well. No cyanosis. No redness. Trace right ankle edema  Skin: dry, intact, no bleeding    Discharge Medications and Instructions:     Discharge Medications   NavdeepNahum jacksonkaren DUEÑAS   Home Medication Instructions TAINA:[id number]    Printed on:03/23/18 9613   Medication Information                      ceFAZolin in dextrose (ANCEF) 2-4 GM/100ML-% solution IVPB  Infuse 100 mL into a venous catheter Every 12 (Twelve) Hours for 26 days.             famotidine (PEPCID) 20 MG tablet  Take 1 tablet by mouth 2 (Two) Times a Day for 30 days.             nisoldipine (SULAR) 8.5 MG 24 hr tablet  Take 8.5 mg by mouth Daily.             PARoxetine (PAXIL) 20 MG tablet  Take 20 mg by mouth Every Morning.             prednisoLONE acetate (PRED FORTE) 1 % ophthalmic suspension  1 drop 4 (Four) Times a Day.             predniSONE (DELTASONE) 20 MG tablet  Take 2 tablets by mouth Daily.                 Discharge Diet:    Dietary Orders     Start     Ordered    03/20/18 1152  Diet Regular; Consistent Carbohydrate  Diet Effective Now     Question Answer Comment   Diet Texture / Consistency Regular    Common Modifiers Consistent Carbohydrate        03/20/18 1151          Activity at Discharge:       Discharge disposition: Home    Discharge Instructions and Follow ups:  With Dr Baum in 2 weeks with CXR and BMP  Follow-up Information     Zach Chi DO .    Specialty:  Family Medicine  Contact information:  [address] [place] [address] [phone]                 No future appointments.     Medication Reconciliation: Please see electronically completed Med Rec.    Total time spent discharging patient including evaluation, medication reconciliation, arranging follow up, and post hospitalization instructions and education total time exceeds 30 minutes.     Teresa MORLEY  MD Issac  03/23/18  2:26 PM      Dictated utilizing Dragon dictation:  Much of this encounter note is an electronic transcription/translation of spoken language to printed text. The electronic translation of spoken language may permit erroneous, or at times, nonsensical words or phrases to be inadvertently transcribed; Although I have reviewed the note for such errors, some may still exist.    Electronically signed by Teresa Davenport MD at 3/23/2018  2:31 PM        No

## 2024-10-26 ENCOUNTER — HOSPITAL ENCOUNTER (OUTPATIENT)
Facility: HOSPITAL | Age: 69
Discharge: HOME | End: 2024-10-26
Payer: MEDICARE

## 2024-10-26 DIAGNOSIS — D86.9 SARCOIDOSIS: ICD-10-CM

## 2024-10-26 PROCEDURE — 71250 CT THORAX DX C-: CPT

## 2025-03-15 NOTE — PROGRESS NOTES
Clinical Pharmacy Services: Medication History    Gilson Sethi is a 62 y.o. male presenting to [facility] for Sepsis [A41.9]    He  has a past medical history of Dyspnea on exertion; Hypertension; Lymphadenopathy; and Pulmonary fibrosis.    Allergies as of 03/18/2018   • (No Known Allergies)       Medication information was obtained from: Patient brought in Rx bottles  Pharmacy and Phone Number: [email or web address]    Prior to Admission Medications     Prescriptions Last Dose Informant Patient Reported? Taking?    methotrexate 2.5 MG tablet   Yes Yes    Take 17.5 mg by mouth 1 (One) Time Per Week.    nisoldipine (SULAR) 8.5 MG 24 hr tablet   Yes No    Take 8.5 mg by mouth Daily.    PARoxetine (PAXIL) 20 MG tablet   Yes Yes    Take 20 mg by mouth Every Morning.    prednisoLONE acetate (PRED FORTE) 1 % ophthalmic suspension   Yes No    1 drop 4 (Four) Times a Day.    predniSONE (DELTASONE) 20 MG tablet   Yes Yes    Take 40 mg by mouth Daily.            Medication notes: Spoke with patient and reviewed med bottles.  Added the methotrexate, prednisone, and paroxetine.    This medication list is complete to the best of my knowledge as of 3/19/2018    Please call if questions.    Kentrell Escalante  3/19/2018 3:46 PM       1.41

## 2025-08-12 ENCOUNTER — TRANSCRIBE ORDERS (OUTPATIENT)
Dept: ADMINISTRATIVE | Facility: HOSPITAL | Age: 70
End: 2025-08-12
Payer: MEDICARE

## 2025-08-12 DIAGNOSIS — C61 PROSTATIC CANCER: Primary | ICD-10-CM

## 2025-08-12 DIAGNOSIS — R97.20 RISING PSA LEVEL: ICD-10-CM

## (undated) DEVICE — ADAPT SWVL FIBROPTIC BRONCH

## (undated) DEVICE — MSK AIRWY LARYNG LMA UNIQUE STD PK SZ4

## (undated) DEVICE — BITEBLOCK OMNI BLOC

## (undated) DEVICE — VITAL SIGNS™ JACKSON-REES CIRCUITS: Brand: VITAL SIGNS™

## (undated) DEVICE — TUBING, SUCTION, 1/4" X 10', STRAIGHT: Brand: MEDLINE

## (undated) DEVICE — LN SMPL O2 NASL/ORL SMART/CAPNOLINE PLS A/

## (undated) DEVICE — SINGLE USE BIOPSY VALVE MAJ-210: Brand: SINGLE USE BIOPSY VALVE (STERILE)

## (undated) DEVICE — TBG 02 CRUSH RESIST LF CLR 7FT

## (undated) DEVICE — SINGLE USE SUCTION VALVE MAJ-209: Brand: SINGLE USE SUCTION VALVE (STERILE)

## (undated) DEVICE — TRAP,MUCUS SPECIMEN, 80CC: Brand: MEDLINE

## (undated) DEVICE — FRCP BX RADJAW4 GASTRO PED 1.8X160X2